# Patient Record
Sex: FEMALE | Race: WHITE | NOT HISPANIC OR LATINO | Employment: FULL TIME | URBAN - METROPOLITAN AREA
[De-identification: names, ages, dates, MRNs, and addresses within clinical notes are randomized per-mention and may not be internally consistent; named-entity substitution may affect disease eponyms.]

---

## 2021-03-16 ENCOUNTER — IMMUNIZATION (OUTPATIENT)
Dept: NURSING | Facility: CLINIC | Age: 41
End: 2021-03-16
Payer: COMMERCIAL

## 2021-03-16 PROCEDURE — 0001A PR COVID VAC PFIZER DIL RECON 30 MCG/0.3 ML IM: CPT

## 2021-03-16 PROCEDURE — 91300 PR COVID VAC PFIZER DIL RECON 30 MCG/0.3 ML IM: CPT

## 2021-04-06 ENCOUNTER — IMMUNIZATION (OUTPATIENT)
Dept: NURSING | Facility: CLINIC | Age: 41
End: 2021-04-06
Attending: INTERNAL MEDICINE
Payer: COMMERCIAL

## 2021-04-06 PROCEDURE — 0002A PR COVID VAC PFIZER DIL RECON 30 MCG/0.3 ML IM: CPT

## 2021-04-06 PROCEDURE — 91300 PR COVID VAC PFIZER DIL RECON 30 MCG/0.3 ML IM: CPT

## 2021-05-01 ENCOUNTER — HEALTH MAINTENANCE LETTER (OUTPATIENT)
Age: 41
End: 2021-05-01

## 2021-10-11 ENCOUNTER — HEALTH MAINTENANCE LETTER (OUTPATIENT)
Age: 41
End: 2021-10-11

## 2022-05-22 ENCOUNTER — HEALTH MAINTENANCE LETTER (OUTPATIENT)
Age: 42
End: 2022-05-22

## 2022-09-25 ENCOUNTER — HEALTH MAINTENANCE LETTER (OUTPATIENT)
Age: 42
End: 2022-09-25

## 2023-03-30 ENCOUNTER — OFFICE VISIT (OUTPATIENT)
Dept: FAMILY MEDICINE | Facility: CLINIC | Age: 43
End: 2023-03-30
Payer: COMMERCIAL

## 2023-03-30 VITALS
HEIGHT: 68 IN | WEIGHT: 293 LBS | HEART RATE: 86 BPM | OXYGEN SATURATION: 96 % | TEMPERATURE: 98 F | DIASTOLIC BLOOD PRESSURE: 101 MMHG | BODY MASS INDEX: 44.41 KG/M2 | SYSTOLIC BLOOD PRESSURE: 141 MMHG

## 2023-03-30 DIAGNOSIS — J34.3 HYPERTROPHY OF NASAL TURBINATES: ICD-10-CM

## 2023-03-30 DIAGNOSIS — Z01.818 PREOP GENERAL PHYSICAL EXAM: Primary | ICD-10-CM

## 2023-03-30 DIAGNOSIS — E66.01 MORBID OBESITY (H): ICD-10-CM

## 2023-03-30 PROBLEM — R87.810 ASCUS WITH POSITIVE HIGH RISK HPV CERVICAL: Status: ACTIVE | Noted: 2017-05-10

## 2023-03-30 PROBLEM — R87.610 ASCUS WITH POSITIVE HIGH RISK HPV CERVICAL: Status: ACTIVE | Noted: 2017-05-10

## 2023-03-30 PROBLEM — Z97.5 IUD (INTRAUTERINE DEVICE) IN PLACE: Status: ACTIVE | Noted: 2019-08-15

## 2023-03-30 PROCEDURE — 99204 OFFICE O/P NEW MOD 45 MIN: CPT | Performed by: PHYSICIAN ASSISTANT

## 2023-03-30 RX ORDER — ESCITALOPRAM OXALATE 20 MG/1
1 TABLET ORAL DAILY
COMMUNITY
Start: 2022-04-02 | End: 2023-06-05

## 2023-03-30 NOTE — PROGRESS NOTES
[facility]  [address] [place] [address]  [place] [address]  Phone: [phone]  Primary Provider: Nilam Augustin  Pre-op Performing Provider: NILAM AUGUSTIN      PREOPERATIVE EVALUATION:  Today's date: 3/30/2023    Digna Jacome is a 42 year old female who presents for a preoperative evaluation.  Additional Questions 3/30/2023   Roomed by reg     Surgical Information:  Surgery/Procedure: sinus endoscopy  Surgery Location: [facility]  Surgeon: Dr. Valladares  Surgery Date: 4/4/23  Time of Surgery: 1:00pm  Where patient plans to recover: At home with family  Fax number for surgical facility: [phone]    Assessment & Plan     The proposed surgical procedure is considered INTERMEDIATE risk.    Preop general physical exam  Hypertrophy of nasal turbinates  Morbid obesity (H)         Risks and Recommendations:  The patient has the following additional risks and recommendations for perioperative complications:   - Morbid obesity (BMI >40)    Medication Instructions:  Patient is to take all scheduled medications on the day of surgery    RECOMMENDATION:  APPROVAL GIVEN to proceed with proposed procedure, without further diagnostic evaluation.            Subjective     HPI related to upcoming procedure: Persistent difficulty breathing through nose.     Preop Questions 3/30/2023   1. Have you ever had a heart attack or stroke? No   2. Have you ever had surgery on your heart or blood vessels, such as a stent placement, a coronary artery bypass, or surgery on an artery in your head, neck, heart, or legs? No   3. Do you have chest pain with activity? No   4. Do you have a history of  heart failure? No   5. Do you currently have a cold, bronchitis or symptoms of other infection? No   6. Do you have a cough, shortness of breath, or wheezing? No   7. Do you or anyone in your family have previous history of blood clots? No   8. Do you or does anyone in your family have a serious bleeding problem such as prolonged  bleeding following surgeries or cuts? No   9. Have you ever had problems with anemia or been told to take iron pills? No   10. Have you had any abnormal blood loss such as black, tarry or bloody stools, or abnormal vaginal bleeding? No   11. Have you ever had a blood transfusion? No   12. Are you willing to have a blood transfusion if it is medically needed before, during, or after your surgery? Yes   13. Have you or any of your relatives ever had problems with anesthesia? No   14. Do you have sleep apnea, excessive snoring or daytime drowsiness? YES - due to sinus issues   14a. Do you have a CPAP machine? No   15. Do you have any artifical heart valves or other implanted medical devices like a pacemaker, defibrillator, or continuous glucose monitor? No   16. Do you have artificial joints? No   17. Are you allergic to latex? No   18. Is there any chance that you may be pregnant? No       Health Care Directive:  Patient does not have a Health Care Directive or Living Will: Discussed advance care planning with patient; information given to patient to review.    Preoperative Review of :   reviewed - no record of controlled substances prescribed.          Review of Systems  CONSTITUTIONAL: NEGATIVE for fever, chills, change in weight  INTEGUMENTARY/SKIN: NEGATIVE for worrisome rashes, moles or lesions  EYES: NEGATIVE for vision changes or irritation  ENT/MOUTH: NEGATIVE for ear, mouth and throat problems  RESP: NEGATIVE for significant cough or SOB  CV: NEGATIVE for chest pain, palpitations or peripheral edema  GI: NEGATIVE for nausea, abdominal pain, heartburn, or change in bowel habits  : NEGATIVE for frequency, dysuria, or hematuria  MUSCULOSKELETAL: NEGATIVE for significant arthralgias or myalgia  NEURO: NEGATIVE for weakness, dizziness or paresthesias  ENDOCRINE: NEGATIVE for temperature intolerance, skin/hair changes  HEME: NEGATIVE for bleeding problems  PSYCHIATRIC: NEGATIVE for changes in mood or  "affect    Patient Active Problem List    Diagnosis Date Noted     Morbid obesity (H) 2023     Priority: Medium     IUD (intrauterine device) in place 08/15/2019     Priority: Medium     Formatting of this note might be different from the original.  Mirena, placed 8/15/2019 .       ASCUS with positive high risk HPV cervical 05/10/2017     Priority: Medium     Formatting of this note might be different from the original.  20 office note documents no hx of abnormal pap tests   NILM, HPV negative 36 y.o.   ASCUS; HPV High Risk Other Than 16/18  40 y.o.  Plan per ASCCP guidelines: [place]       Generalized anxiety disorder 2013     Priority: Medium     Chronic rhinitis 2012     Priority: Medium     Hypertrophy of nasal turbinates 2012     Priority: Medium     Deviated nasal septum 2012     Priority: Medium     Essential hypertension, benign 2012     Priority: Medium      History reviewed. No pertinent past medical history.  History reviewed. No pertinent surgical history.  Current Outpatient Medications   Medication Sig Dispense Refill     escitalopram (LEXAPRO) 20 MG tablet Take 1 tablet by mouth daily       levonorgestrel (MIRENA) 20 MCG/DAY IUD 1 each by Intrauterine route         Allergies   Allergen Reactions     Erythromycin Nausea and Vomiting     A-Cillin Rash        Social History     Tobacco Use     Smoking status: Former     Types: Cigarettes     Quit date: 2011     Years since quittin.2     Smokeless tobacco: Never     Tobacco comments:     Quit smoking 5 yrs ago   Substance Use Topics     Alcohol use: Yes     Comment: Alcoholic Drinks/day: 2 -3 drinks/week       History   Drug Use No         Objective     BP (!) 141/101 (BP Location: Right arm, Patient Position: Sitting, Cuff Size: Adult Large)   Pulse 86   Temp 98  F (36.7  C) (Tympanic)   Ht 1.721 m (5' 7.75\")   Wt (!) 157.2 kg (346 lb 9.6 oz)   LMP  (LMP Unknown)   SpO2 96%   BMI 53.09 kg/m  "     Physical Exam    GENERAL APPEARANCE: healthy, alert and no distress     EYES: EOMI, PERRL     HENT: ear canals and TM's normal and nose and mouth without ulcers or lesions     NECK: no adenopathy, no asymmetry, masses, or scars and thyroid normal to palpation     RESP: lungs clear to auscultation - no rales, rhonchi or wheezes     CV: regular rates and rhythm, normal S1 S2, no S3 or S4 and no murmur, click or rub     ABDOMEN:  soft, nontender, no HSM or masses and bowel sounds normal     MS: extremities normal- no gross deformities noted, no evidence of inflammation in joints, FROM in all extremities.     SKIN: no suspicious lesions or rashes     NEURO: Normal strength and tone, sensory exam grossly normal, mentation intact and speech normal     PSYCH: mentation appears normal. and affect normal/bright     LYMPHATICS: No cervical adenopathy      Diagnostics:  No labs were ordered during this visit.   No EKG required, no history of coronary heart disease, significant arrhythmia, peripheral arterial disease or other structural heart disease.    Revised Cardiac Risk Index (RCRI):  The patient has the following serious cardiovascular risks for perioperative complications:   - No serious cardiac risks = 0 points     RCRI Interpretation: 0 points: Class I (very low risk - 0.4% complication rate)           Signed Electronically by: Nilam Roca PA-C  Copy of this evaluation report is provided to requesting physician.

## 2023-03-30 NOTE — PATIENT INSTRUCTIONS
For informational purposes only. Not to replace the advice of your health care provider. Copyright   2003,  [facility]. All rights reserved. Clinically reviewed by Shaneka Wilson MD. [employer] [id number] - REV .  Preparing for Your Surgery  Getting started  A nurse will call you to review your health history and instructions. They will give you an arrival time based on your scheduled surgery time. Please be ready to share:    Your doctor's clinic name and phone number    Your medical, surgical, and anesthesia history    A list of allergies and sensitivities    A list of medicines, including herbal treatments and over-the-counter drugs    Whether the patient has a legal guardian (ask how to send us the papers in advance)  Please tell us if you're pregnant--or if there's any chance you might be pregnant. Some surgeries may injure a fetus (unborn baby), so they require a pregnancy test. Surgeries that are safe for a fetus don't always need a test, and you can choose whether to have one.   If you have a child who's having surgery, please ask for a copy of Preparing for Your Child's Surgery.    Preparing for surgery    Within 10 to 30 days of surgery: Have a pre-op exam (sometimes called an H&P, or History and Physical). This can be done at a clinic or pre-operative center.  ? If you're having a , you may not need this exam. Talk to your care team.    At your pre-op exam, talk to your care team about all medicines you take. If you need to stop any medicines before surgery, ask when to start taking them again.  ? We do this for your safety. Many medicines can make you bleed too much during surgery. Some change how well surgery (anesthesia) drugs work.    Call your insurance company to let them know you're having surgery. (If you don't have insurance, call [phone].)    Call your clinic if there's any change in your health. This includes signs of a cold or flu (sore throat, runny nose,  cough, rash, fever). It also includes a scrape or scratch near the surgery site.    If you have questions on the day of surgery, call your hospital or surgery center.  Eating and drinking guidelines  For your safety: Unless your surgeon tells you otherwise, follow the guidelines below.    Eat and drink as usual until 8 hours before you arrive for surgery. After that, no food or milk.    Drink clear liquids until 2 hours before you arrive. These are liquids you can see through, like water, Gatorade, and Propel Water. They also include plain black coffee and tea (no cream or milk), candy, and breath mints. You can spit out gum when you arrive.    If you drink alcohol: Stop drinking it the night before surgery.    If your care team tells you to take medicine on the morning of surgery, it's okay to take it with a sip of water.  Preventing infection    Shower or bathe the night before and morning of your surgery. Follow the instructions your clinic gave you. (If no instructions, use regular soap.)    Don't shave or clip hair near your surgery site. We'll remove the hair if needed.    Don't smoke or vape the morning of surgery. You may chew nicotine gum up to 2 hours before surgery. A nicotine patch is okay.  ? Note: Some surgeries require you to completely quit smoking and nicotine. Check with your surgeon.    Your care team will make every effort to keep you safe from infection. We will:  ? Clean our hands often with soap and water (or an alcohol-based hand rub).  ? Clean the skin at your surgery site with a special soap that kills germs.  ? Give you a special gown to keep you warm. (Cold raises the risk of infection.)  ? Wear special hair covers, masks, gowns and gloves during surgery.  ? Give antibiotic medicine, if prescribed. Not all surgeries need antibiotics.  What to bring on the day of surgery    Photo ID and insurance card    Copy of your health care directive, if you have one    Glasses and hearing aids (bring  cases)  ? You can't wear contacts during surgery    Inhaler and eye drops, if you use them (tell us about these when you arrive)    CPAP machine or breathing device, if you use them    A few personal items, if spending the night    If you have . . .  ? A pacemaker, ICD (cardiac defibrillator) or other implant: Bring the ID card.  ? An implanted stimulator: Bring the remote control.  ? A legal guardian: Bring a copy of the certified (court-stamped) guardianship papers.  Please remove any jewelry, including body piercings. Leave jewelry and other valuables at home.  If you're going home the day of surgery    You must have a responsible adult drive you home. They should stay with you overnight as well.    If you don't have someone to stay with you, and you aren't safe to go home alone, we may keep you overnight. Insurance often won't pay for this.  After surgery  If it's hard to control your pain or you need more pain medicine, please call your surgeon's office.  Questions?   If you have any questions for your care team, list them here: _________________________________________________________________________________________________________________________________________________________________________ ____________________________________ ____________________________________ ____________________________________

## 2023-06-04 ENCOUNTER — HEALTH MAINTENANCE LETTER (OUTPATIENT)
Age: 43
End: 2023-06-04

## 2023-06-05 ENCOUNTER — OFFICE VISIT (OUTPATIENT)
Dept: FAMILY MEDICINE | Facility: CLINIC | Age: 43
End: 2023-06-05
Payer: COMMERCIAL

## 2023-06-05 VITALS
RESPIRATION RATE: 16 BRPM | BODY MASS INDEX: 43.4 KG/M2 | WEIGHT: 293 LBS | HEART RATE: 80 BPM | OXYGEN SATURATION: 97 % | SYSTOLIC BLOOD PRESSURE: 138 MMHG | HEIGHT: 69 IN | TEMPERATURE: 97.4 F | DIASTOLIC BLOOD PRESSURE: 90 MMHG

## 2023-06-05 DIAGNOSIS — Z00.00 ROUTINE GENERAL MEDICAL EXAMINATION AT A HEALTH CARE FACILITY: Primary | ICD-10-CM

## 2023-06-05 DIAGNOSIS — I10 ESSENTIAL HYPERTENSION, BENIGN: ICD-10-CM

## 2023-06-05 DIAGNOSIS — Z12.4 CERVICAL CANCER SCREENING: ICD-10-CM

## 2023-06-05 DIAGNOSIS — F41.1 GENERALIZED ANXIETY DISORDER: ICD-10-CM

## 2023-06-05 DIAGNOSIS — R73.09 ELEVATED GLUCOSE: ICD-10-CM

## 2023-06-05 DIAGNOSIS — R63.5 WEIGHT GAIN: ICD-10-CM

## 2023-06-05 LAB
ERYTHROCYTE [DISTWIDTH] IN BLOOD BY AUTOMATED COUNT: 13.6 % (ref 10–15)
HCT VFR BLD AUTO: 38.9 % (ref 35–47)
HGB BLD-MCNC: 12.5 G/DL (ref 11.7–15.7)
MCH RBC QN AUTO: 28.2 PG (ref 26.5–33)
MCHC RBC AUTO-ENTMCNC: 32.1 G/DL (ref 31.5–36.5)
MCV RBC AUTO: 88 FL (ref 78–100)
PLATELET # BLD AUTO: 365 10E3/UL (ref 150–450)
RBC # BLD AUTO: 4.43 10E6/UL (ref 3.8–5.2)
WBC # BLD AUTO: 10.2 10E3/UL (ref 4–11)

## 2023-06-05 PROCEDURE — 99396 PREV VISIT EST AGE 40-64: CPT | Performed by: FAMILY MEDICINE

## 2023-06-05 PROCEDURE — 85027 COMPLETE CBC AUTOMATED: CPT | Performed by: FAMILY MEDICINE

## 2023-06-05 PROCEDURE — 84443 ASSAY THYROID STIM HORMONE: CPT | Performed by: FAMILY MEDICINE

## 2023-06-05 PROCEDURE — G0145 SCR C/V CYTO,THINLAYER,RESCR: HCPCS | Performed by: FAMILY MEDICINE

## 2023-06-05 PROCEDURE — 87624 HPV HI-RISK TYP POOLED RSLT: CPT | Performed by: FAMILY MEDICINE

## 2023-06-05 PROCEDURE — 80061 LIPID PANEL: CPT | Performed by: FAMILY MEDICINE

## 2023-06-05 PROCEDURE — 83036 HEMOGLOBIN GLYCOSYLATED A1C: CPT | Performed by: FAMILY MEDICINE

## 2023-06-05 PROCEDURE — 36415 COLL VENOUS BLD VENIPUNCTURE: CPT | Performed by: FAMILY MEDICINE

## 2023-06-05 PROCEDURE — 99213 OFFICE O/P EST LOW 20 MIN: CPT | Mod: 25 | Performed by: FAMILY MEDICINE

## 2023-06-05 PROCEDURE — 80053 COMPREHEN METABOLIC PANEL: CPT | Performed by: FAMILY MEDICINE

## 2023-06-05 RX ORDER — ESCITALOPRAM OXALATE 20 MG/1
20 TABLET ORAL DAILY
Qty: 90 TABLET | Refills: 3 | Status: SHIPPED | OUTPATIENT
Start: 2023-06-05 | End: 2024-08-02

## 2023-06-05 ASSESSMENT — ENCOUNTER SYMPTOMS
CONSTIPATION: 0
FEVER: 0
BREAST MASS: 0
COUGH: 0
ABDOMINAL PAIN: 0
HEMATURIA: 0
HEARTBURN: 0
WEAKNESS: 0
PALPITATIONS: 0
DYSURIA: 0
NERVOUS/ANXIOUS: 1
MYALGIAS: 0
DIZZINESS: 0
CHILLS: 0
HEADACHES: 0
HEMATOCHEZIA: 0
EYE PAIN: 0
SORE THROAT: 0
PARESTHESIAS: 0
SHORTNESS OF BREATH: 0
ARTHRALGIAS: 0
NAUSEA: 0
DIARRHEA: 0
JOINT SWELLING: 0
FREQUENCY: 0

## 2023-06-05 NOTE — PROGRESS NOTES
SUBJECTIVE:   CC: Digna is an 42 year old who presents for preventive health visit.       2023     1:52 PM   Additional Questions   Roomed by Mela Warren MA   Accompanied by SELF     Healthy Habits:    Getting at least 3 servings of Calcium per day:  Yes    Bi-annual eye exam:  NO    Dental care twice a year:  NO    Sleep apnea or symptoms of sleep apnea:  None    Diet:  Regular (no restrictions)    Frequency of exercise:  2-3 days/week    Duration of exercise:  45-60 minutes    Taking medications regularly:  Yes    Medication side effects:  None    PHQ-2 Total Score:    Additional concerns today:  [place]    Has been out of lexapro for about a month. Has taken this for several years and it helps her anxiety a lot. Previous PCP left the clinic.     Just started working with a nutritionist at a clinic in [place], talking about movement, too. She does typically hike with her dogs every weekend.     Previously did 3 months of BP meds but they didn't seem to help. She checks on her own and it's been 140/90. Checks once every couple months.     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 Very close with parents and supportive partner. Has 2 dogs. Lots of volunteer work. Escorting at  clinics, also at an animal shelter.        Social History     Tobacco Use     Smoking status: Former     Years: 10.00     Types: Cigarettes     Start date: 1999     Quit date: 2011     Years since quittin.3     Smokeless tobacco: Never     Tobacco comments:     Mainly smoked when drinking/at bars   Vaping Use     Vaping status: Never Used     Passive vaping exposure: Yes   Substance Use Topics     Alcohol use: Yes     Comment: Alcoholic Drinks/day: 2 -3 drinks/week           2023     1:11 PM   Alcohol Use   Prescreen: >3 drinks/day or >7 drinks/week? No     Reviewed orders with patient.  Reviewed health  maintenance and updated orders accordingly - Yes    Breast Cancer Screening:    FHS-7:       3/30/2023     7:22 AM 6/5/2023     1:12 PM   Breast CA Risk Assessment (FHS-7)   Did any of your first-degree relatives have breast or ovarian cancer? Yes No   Did any of your relatives have bilateral breast cancer? No Unknown   Did any man in your family have breast cancer? No No   Did any woman in your family have breast and ovarian cancer? No Yes   Did any woman in your family have breast cancer before age 50 y? No No   Do you have 2 or more relatives with breast and/or ovarian cancer? Yes No   Do you have 2 or more relatives with breast and/or bowel cancer? No No       Mammogram Screening - Offered annual screening and updated Health Maintenance for mutual plan based on risk factor consideration. Patient would like to hold off on mammogram at this time.     History of abnormal Pap smear: NO - age 30-65 PAP every 5 years with negative HPV co-testing recommended     Reviewed and updated as needed this visit by clinical staff   Tobacco  Allergies  Meds              Reviewed and updated as needed this visit by Provider                     Review of Systems   Constitutional: Negative for chills and fever.   HENT: Negative for congestion, ear pain, hearing loss and sore throat.    Eyes: Negative for pain and visual disturbance.   Respiratory: Negative for cough and shortness of breath.    Cardiovascular: Negative for chest pain, palpitations and peripheral edema.   Gastrointestinal: Negative for abdominal pain, constipation, diarrhea, heartburn, hematochezia and nausea.   Breasts:  Negative for tenderness, breast mass and discharge.   Genitourinary: Negative for dysuria, frequency, genital sores, hematuria, pelvic pain, urgency, vaginal bleeding and vaginal discharge.   Musculoskeletal: Negative for arthralgias, joint swelling and myalgias.   Skin: Negative for rash.   Neurological: Negative for dizziness, weakness,  "headaches and paresthesias.   Psychiatric/Behavioral: Negative for mood changes. The patient is nervous/anxious.         OBJECTIVE:   BP (!) 135/94   Pulse 80   Temp 97.4  F (36.3  C) (Temporal)   Resp 16   Ht 1.74 m (5' 8.5\")   Wt (!) 159.9 kg (352 lb 8 oz)   SpO2 97%   BMI 52.82 kg/m    Physical Exam  GENERAL: healthy, alert and no distress  EYES: Eyes grossly normal to inspection, PERRL and conjunctivae and sclerae normal  HENT: ear canals and TM's normal, nose and mouth without ulcers or lesions  NECK: no adenopathy, no asymmetry, masses, or scars and thyroid normal to palpation  RESP: lungs clear to auscultation - no rales, rhonchi or wheezes  BREAST: normal without masses, tenderness or nipple discharge and no palpable axillary masses or adenopathy  CV: regular rate and rhythm, normal S1 S2, no S3 or S4, no murmur, click or rub, no peripheral edema and peripheral pulses strong  ABDOMEN: soft, nontender, no hepatosplenomegaly, no masses and bowel sounds normal   (female): normal female external genitalia, normal urethral meatus, vaginal mucosa pink, moist, well rugated, and normal cervix/adnexa/uterus without masses or discharge  MS: no gross musculoskeletal defects noted, no edema  SKIN: no suspicious lesions or rashes. Some scattered nevi, benign-appearing.   NEURO: Normal strength and tone, mentation intact and speech normal  PSYCH: mentation appears normal, affect normal/bright    Diagnostic Test Results:  Labs reviewed in Epic    ASSESSMENT/PLAN:   Digna was seen today for physical.    Diagnoses and all orders for this visit:    Routine general medical examination at a health care facility  -     Lipid Profile  -     Glucose  -     Comprehensive metabolic panel (BMP + Alb, Alk Phos, ALT, AST, Total. Bili, TP)  -     CBC with platelets      Cervical cancer screening  -     Pap Screen with HPV - recommended age 30 - 65 years  -     HPV Hold (Lab Only)    Generalized anxiety disorder  -     " "escitalopram (LEXAPRO) 20 MG tablet; Take 1 tablet (20 mg) by mouth daily    Weight gain: will check TSH given recent weight gain. Discussed efforts at healthy nutrition habits and exercise. She will let me know if she would like any more resources around this.   -     TSH with free T4 reflex    Essential hypertension, benign: BP is 138/90 today on second check. She would like to monitor more consistently at home for the next couple of weeks. Will check daily and send me results over [employer]. If persistently elevated, would start antihypertensive.     Other orders  -     REVIEW OF HEALTH MAINTENANCE PROTOCOL ORDERS        Patient has been advised of split billing requirements and indicates understanding: Yes      COUNSELING:  Reviewed preventive health counseling, as reflected in patient instructions       Regular exercise       Healthy diet/nutrition      BMI:   Estimated body mass index is 52.82 kg/m  as calculated from the following:    Height as of this encounter: 1.74 m (5' 8.5\").    Weight as of this encounter: 159.9 kg (352 lb 8 oz).       She reports that she quit smoking about 12 years ago. Her smoking use included cigarettes. She started smoking about 23 years ago. She has never used smokeless tobacco.      Lindsay Turner MD  [facility]  "

## 2023-06-06 LAB
ALBUMIN SERPL BCG-MCNC: 4.1 G/DL (ref 3.5–5.2)
ALP SERPL-CCNC: 91 U/L (ref 35–104)
ALT SERPL W P-5'-P-CCNC: 16 U/L (ref 10–35)
ANION GAP SERPL CALCULATED.3IONS-SCNC: 12 MMOL/L (ref 7–15)
AST SERPL W P-5'-P-CCNC: 21 U/L (ref 10–35)
BILIRUB SERPL-MCNC: 0.4 MG/DL
BUN SERPL-MCNC: 7.6 MG/DL (ref 6–20)
CALCIUM SERPL-MCNC: 9 MG/DL (ref 8.6–10)
CHLORIDE SERPL-SCNC: 105 MMOL/L (ref 98–107)
CHOLEST SERPL-MCNC: 142 MG/DL
CREAT SERPL-MCNC: 0.63 MG/DL (ref 0.51–0.95)
DEPRECATED HCO3 PLAS-SCNC: 19 MMOL/L (ref 22–29)
FASTING STATUS PATIENT QL REPORTED: YES
GFR SERPL CREATININE-BSD FRML MDRD: >90 ML/MIN/1.73M2
GLUCOSE SERPL-MCNC: 101 MG/DL (ref 70–99)
GLUCOSE SERPL-MCNC: 101 MG/DL (ref 70–99)
HDLC SERPL-MCNC: 45 MG/DL
LDLC SERPL CALC-MCNC: 68 MG/DL
NONHDLC SERPL-MCNC: 97 MG/DL
POTASSIUM SERPL-SCNC: 4 MMOL/L (ref 3.4–5.3)
PROT SERPL-MCNC: 7.4 G/DL (ref 6.4–8.3)
SODIUM SERPL-SCNC: 136 MMOL/L (ref 136–145)
TRIGL SERPL-MCNC: 145 MG/DL
TSH SERPL DL<=0.005 MIU/L-ACNC: 3.16 UIU/ML (ref 0.3–4.2)

## 2023-06-07 LAB
BKR LAB AP GYN ADEQUACY: NORMAL
BKR LAB AP GYN INTERPRETATION: NORMAL
BKR LAB AP HPV REFLEX: NORMAL
BKR LAB AP PREVIOUS ABNORMAL: NORMAL
HBA1C MFR BLD: 6.2 % (ref 0–5.6)
PATH REPORT.COMMENTS IMP SPEC: NORMAL
PATH REPORT.COMMENTS IMP SPEC: NORMAL
PATH REPORT.RELEVANT HX SPEC: NORMAL

## 2023-06-08 LAB
HUMAN PAPILLOMA VIRUS 16 DNA: NEGATIVE
HUMAN PAPILLOMA VIRUS 18 DNA: NEGATIVE
HUMAN PAPILLOMA VIRUS FINAL DIAGNOSIS: NORMAL
HUMAN PAPILLOMA VIRUS OTHER HR: NEGATIVE

## 2023-09-12 ENCOUNTER — OFFICE VISIT (OUTPATIENT)
Dept: ENDOCRINOLOGY | Facility: CLINIC | Age: 43
End: 2023-09-12
Payer: COMMERCIAL

## 2023-09-12 ENCOUNTER — TELEPHONE (OUTPATIENT)
Dept: ENDOCRINOLOGY | Facility: CLINIC | Age: 43
End: 2023-09-12

## 2023-09-12 ENCOUNTER — VIRTUAL VISIT (OUTPATIENT)
Dept: ENDOCRINOLOGY | Facility: CLINIC | Age: 43
End: 2023-09-12
Payer: COMMERCIAL

## 2023-09-12 VITALS
DIASTOLIC BLOOD PRESSURE: 93 MMHG | BODY MASS INDEX: 43.4 KG/M2 | HEART RATE: 87 BPM | OXYGEN SATURATION: 98 % | HEIGHT: 69 IN | WEIGHT: 293 LBS | SYSTOLIC BLOOD PRESSURE: 135 MMHG

## 2023-09-12 VITALS — HEIGHT: 69 IN | BODY MASS INDEX: 43.4 KG/M2 | WEIGHT: 293 LBS

## 2023-09-12 DIAGNOSIS — E66.01 MORBID OBESITY (H): ICD-10-CM

## 2023-09-12 DIAGNOSIS — E66.01 MORBID OBESITY (H): Primary | ICD-10-CM

## 2023-09-12 DIAGNOSIS — Z71.3 NUTRITIONAL COUNSELING: Primary | ICD-10-CM

## 2023-09-12 PROCEDURE — 99207 PR NO CHARGE LOS: CPT | Mod: VID | Performed by: DIETITIAN, REGISTERED

## 2023-09-12 PROCEDURE — 97802 MEDICAL NUTRITION INDIV IN: CPT | Mod: VID | Performed by: DIETITIAN, REGISTERED

## 2023-09-12 PROCEDURE — 99203 OFFICE O/P NEW LOW 30 MIN: CPT | Performed by: INTERNAL MEDICINE

## 2023-09-12 RX ORDER — ALPRAZOLAM 0.25 MG
0.25 TABLET ORAL DAILY PRN
COMMUNITY
Start: 2017-09-05

## 2023-09-12 RX ORDER — SEMAGLUTIDE 0.25 MG/.5ML
0.25 INJECTION, SOLUTION SUBCUTANEOUS WEEKLY
Qty: 2 ML | Refills: 0 | Status: SHIPPED | OUTPATIENT
Start: 2023-09-12 | End: 2023-11-13 | Stop reason: ALTCHOICE

## 2023-09-12 ASSESSMENT — PAIN SCALES - GENERAL
PAINLEVEL: NO PAIN (1)
PAINLEVEL: NO PAIN (0)

## 2023-09-12 NOTE — NURSING NOTE
Is the patient currently in the state of [place]? YES    Visit mode:VIDEO    If the visit is dropped, the patient can be reconnected by: VIDEO VISIT: Text to cell phone:   Telephone Information:   Mobile [phone]       Will anyone else be joining the visit? NO  (If patient encounters technical issues they should call [phone])    How would you like to obtain your AVS? MyChart    Are changes needed to the allergy or medication list? Pt stated no changes to allergies and Pt stated no med changes    Reason for visit: Consult    Margaret FALLF

## 2023-09-12 NOTE — PATIENT INSTRUCTIONS
Nutrition  Resources:    Fast, easy meal components/ideas:    - String Cheese    - Hard boiled eggs    - Tuna    - Shrimp     - Roit chicken    - Pre-made chicken in refrigerator section (plain or flavored, example: [employer])     - Frozen chicken     - Deli meat    - Wraps or sandwiches     - Vegetables with hummus OR another dip    - Ready to go grain packs     - Protein bars    - Protein shakes     - Fruit    - Salad kits    - Chicken sausage links     - Smoothies    - Consider Barilla Protein + pasta over regular         Follow-Up:  Wednesday, November 8th at 1:00 pm    JEFF Mccall, RD, LD  Clinic #: [phone]

## 2023-09-12 NOTE — TELEPHONE ENCOUNTER
PA Initiation    Medication: WEGOVY 0.25 MG/0.5ML SC SOAJ  Insurance Company: [employer] - Phone [phone] Fax [phone]  Pharmacy Filling the Rx:    Filling Pharmacy Phone:    Filling Pharmacy Fax:    Start Date: 9/12/2023    Key: [id number]
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
## 2023-09-12 NOTE — LETTER
"2023       RE: Digna Jacome  [address]  [facility] [address]     Dear Colleague,    Thank you for referring your patient, Digna Jacome, to the [facility] WEIGHT MANAGEMENT CLINIC [facility]. Please see a copy of my visit note below.    Video-Visit Details    Type of service:  Video Visit    Video Start Time: 2:35 pm   Video End Time: 3:01 pm    Originating Location (pt. Location): Home    Distant Location (provider location):  Offsite (providers home)     Platform used for Video Visit: Neocutis      New Weight Management Nutrition Consultation    Digna Jacome is a 43 year old female presents today for new weight management nutrition consultation.  Patient referred by Dr. Mari on 2023.    Patient with Co-morbidities of obesity includin/11/2023    11:20 AM   --   I have the following health issues associated with obesity Pre-Diabetes    High Blood Pressure    Asthma   I have the following symptoms associated with obesity Depression    Back Pain    Hip Pain         Anthropometrics:  Weight 23: 354 lbs with BMI 53.04    Estimated body mass index is 53.04 kg/m  as calculated from the following:    Height as of this encounter: 1.74 m (5' 8.5\").    Weight as of this encounter: 160.6 kg (354 lb).    Medications for Weight Loss:  Wegovy    NUTRITION HISTORY  Food allergies: NKFA  Does eat meat/fish/seafood - chicken mostly. Prefers fish cooked by others (feels she struggles to make it taste less fishy)  Food intolerances: doesn't feel good eating higher sugar/higher carb  Endorses being a picky eater  Previous methods of diet modification for weight loss: keto  RD before: No, WW, nutrisource    Pt goals: make better food choices    Meal preps but feels sick of choices come Th/Friday and prefers to order take out.  Tends to be an all or nothing person per pt.    Typically eats 2-3 meals/day.   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 Protein shake or toast or fruit   If you do eat lunch, what types of food do you typically eat? [place] and fruit or popcorn or leftovers   If you do eat supper, what types of food do you typically eat? Leftovers or chicken/rice or delivery or popcorn   If you do snack, what types of food do you typically eat? Popcorn   How many glasses of juice do you drink in a typical day? 0   How many of glasses of milk do you drink in a typical day? 0   How many 8oz glasses of sugar containing drinks such as Saroj-Aid/sweet tea do you drink in a day? 0   How many cans/bottles of sugar pop/soda/tea/sports drinks do you drink in a day? 0   How many cans/bottles of diet pop/soda/tea or sports drink do you drink in a day? 1   How often do you have a drink of alcohol? 2-4 Times a Month   If you do drink, how many drinks might you have in a day? 1 or 2           9/11/2023    11:20 AM   Eating Habits   Generally, my meals include foods like these bread, pasta, rice, potatoes, corn, crackers, sweet dessert, pop, or juice A Few Times a Week   Generally, my meals include foods like these fried meats, brats, burgers, french fries, pizza, cheese, chips, or ice cream Once a Week   Eat fast food (like McDonalds, Burger Ryan, Taco Bell) Once a Week   Eat at a buffet or sit-down restaurant Once a Week   Eat most of my meals in front of the TV or computer Almost Everyday   Often skip meals, eat at random times, have no regular eating times A Few Times a Week   Rarely sit down for a meal but snack or graze throughout Less Than Weekly   Eat extra snacks between meals Less Than Weekly   Eat most of my food at the end of the  day Almost Everyday   Eat in the middle of the night or wake up at night to eat Never   Eat extra snacks to prevent or correct low blood sugar Never   Eat to prevent acid reflux or stomach pain Never   Worry about not having enough food to eat Never   I eat when I am depressed Less Than Weekly   I eat when I am stressed A Few Times a Week   I eat when I am bored A Few Times a Week   I eat when I am anxious A Few Times a Week   I eat when I am happy or as a reward Less Than Weekly   I feel hungry all the time even if I just have eaten A Few Times a Week   Feeling full is important to me Never   I finish all the food on my plate even if I am already full Once a Week   I can't resist eating delicious food or walk past the good food/smell A Few Times a Week   I eat/snack without noticing that I am eating A Few Times a Week   I eat when I am preparing the meal Less Than Weekly   I eat more than usual when I see others eating A Few Times a Week   I have trouble not eating sweets, ice cream, cookies, or chips if they are around the house Almost Everyday   I think about food all day Everyday   What foods, if any, do you crave? Chips/Crackers   Please list any other foods you crave? Cheesy things, anything that can be dipped           9/11/2023    11:20 AM   Amount of Food   I feel out of control when eating Weekly   I eat a large amount of food, like a loaf of bread, a box of cookies, a pint/quart of ice cream, all at once Weekly   I eat a large amount of food even when I am not hungry Almost Everyday   I eat rapidly Everyday   I eat alone because I feel embarrassed and do not want others to see how much I have eaten Weekly   I eat until I am uncomfortably full Weekly   I feel bad, disgusted, or guilty after I overeat Weekly       Physical Activity:        9/11/2023    11:20 AM   Activity/Exercise History   How much of a typical 12 hour day do you spend sitting? Half the Day   How much of a typical 12 hour day do you spend  lying down? Less Than Half the Day   How much of a typical day do you spend walking/standing? Less Than Half the Day   How many hours (not including work) do you spend on the TV/Video Games/Computer/Tablet/Phone? 4-5 Hours   How many times a week are you active for the purpose of exercise? 2-3 Times a Week   What keeps you from being more active? Lack of Time    Too tired    Other   How many total minutes do you spend doing some activity for the purpose of exercising when you exercise? More Than 30 Minutes       Nutrition Prescription  Recommended energy/nutrient modification.      Nutrition Diagnosis  Obesity r/t long history of positive energy balance aeb BMI >30.    Nutrition Intervention  Reviewed current dietary habits and pts history   Discussed long-term goals pt hopes to accomplish in RD appointments  Answered pt questions  Coordination of care   Nutrition education   AVS and handouts via [employer]    Expected Engagement: good    Nutrition  Resources:    Fast, easy meal components/ideas:    - String Cheese    - Hard boiled eggs    - Tuna    - Shrimp     - Roit chicken    - Pre-made chicken in refrigerator section (plain or flavored, example: [employer] Masala Chicken)     - Frozen chicken     - Deli meat    - Wraps or sandwiches     - Vegetables with hummus OR another dip    - Ready to go grain packs     - Protein bars    - Protein shakes     - Fruit    - Salad kits    - Chicken sausage links     - Smoothies    - Consider Barilla Protein + pasta over regular         Follow-Up:  Wednesday, November 8th at 1:00 pm    Time spent with patient: 26 minutes.  JEFF Ramírez, RD, LD

## 2023-09-12 NOTE — PATIENT INSTRUCTIONS
Start Wegovy 0.25 mg weekly x4 weeks;  increase to 0.5 mg weekly x4 weeks;  increase to 1.0 mg weekly for 4 weeks;  increase to 1.7 mg weekly for 4 weeks;  increase to 2.4 mg weekly thereafter    See Lauren Bloch, PharmD in 2 month(s)  See Dr.Tasma MACDONALD in 4 month(s)    If you have any questions, please do not hesitate to call Weight management clinic at [phone] or [phone].  If you need to fax, please fax to [phone].    Sincerely,    Kamaljit Stephens MD  Endocrinology

## 2023-09-12 NOTE — PROGRESS NOTES
"Video-Visit Details    Type of service:  Video Visit    Video Start Time: 2:35 pm   Video End Time: 3:01 pm    Originating Location (pt. Location): Home    Distant Location (provider location):  Offsite (providers home)     Platform used for Video Visit: [employer]      New Weight Management Nutrition Consultation    Digna Jacome is a 43 year old female presents today for new weight management nutrition consultation.  Patient referred by Dr. Mari on 2023.    Patient with Co-morbidities of obesity includin/11/2023    11:20 AM   --   I have the following health issues associated with obesity Pre-Diabetes    High Blood Pressure    Asthma   I have the following symptoms associated with obesity Depression    Back Pain    Hip Pain         Anthropometrics:  Weight 23: 354 lbs with BMI 53.04    Estimated body mass index is 53.04 kg/m  as calculated from the following:    Height as of this encounter: 1.74 m (5' 8.5\").    Weight as of this encounter: 160.6 kg (354 lb).    Medications for Weight Loss:  Wegovy    NUTRITION HISTORY  Food allergies: NKFA  Does eat meat/fish/seafood - chicken mostly. Prefers fish cooked by others (feels she struggles to make it taste less fishy)  Food intolerances: doesn't feel good eating higher sugar/higher carb  Endorses being a picky eater  Previous methods of diet modification for weight loss: keto  RD before: No, WW, nutrisource    Pt goals: make better food choices    Meal preps but feels sick of choices come Thur/Friday and prefers to order take out.  Tends to be an all or nothing person per pt.    Typically eats 2-3 meals/day.   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 Protein shake or toast or fruit   If you do eat lunch, what types of food do you typically eat? [place] and fruit or popcorn or leftovers   If you do eat supper, what types of food do you typically eat? Leftovers or chicken/rice or delivery or popcorn   If you do snack, what types of food do you typically eat? Popcorn   How many glasses of juice do you drink in a typical day? 0   How many of glasses of milk do you drink in a typical day? 0   How many 8oz glasses of sugar containing drinks such as Saroj-Aid/sweet tea do you drink in a day? 0   How many cans/bottles of sugar pop/soda/tea/sports drinks do you drink in a day? 0   How many cans/bottles of diet pop/soda/tea or sports drink do you drink in a day? 1   How often do you have a drink of alcohol? 2-4 Times a Month   If you do drink, how many drinks might you have in a day? 1 or 2           9/11/2023    11:20 AM   Eating Habits   Generally, my meals include foods like these bread, pasta, rice, potatoes, corn, crackers, sweet dessert, pop, or juice A Few Times a Week   Generally, my meals include foods like these fried meats, brats, burgers, french fries, pizza, cheese, chips, or ice cream Once a Week   Eat fast food (like McDonalds, Burger Ryan, Taco Bell) Once a Week   Eat at a buffet or sit-down restaurant Once a Week   Eat most of my meals in front of the TV or computer Almost Everyday   Often skip meals, eat at random times, have no regular eating times A Few Times a Week   Rarely sit down for a meal but snack or graze throughout Less Than Weekly   Eat extra snacks between meals Less Than Weekly   Eat most of my food at the end of the day Almost Everyday   Eat in the middle of the night or wake up at night to eat Never   Eat extra snacks to prevent or correct low blood sugar Never   Eat to prevent acid reflux or stomach pain Never   Worry about not having enough food to eat Never   I eat when I am depressed Less Than Weekly   I eat when I am  stressed A Few Times a Week   I eat when I am bored A Few Times a Week   I eat when I am anxious A Few Times a Week   I eat when I am happy or as a reward Less Than Weekly   I feel hungry all the time even if I just have eaten A Few Times a Week   Feeling full is important to me Never   I finish all the food on my plate even if I am already full Once a Week   I can't resist eating delicious food or walk past the good food/smell A Few Times a Week   I eat/snack without noticing that I am eating A Few Times a Week   I eat when I am preparing the meal Less Than Weekly   I eat more than usual when I see others eating A Few Times a Week   I have trouble not eating sweets, ice cream, cookies, or chips if they are around the house Almost Everyday   I think about food all day Everyday   What foods, if any, do you crave? Chips/Crackers   Please list any other foods you crave? Cheesy things, anything that can be dipped           9/11/2023    11:20 AM   Amount of Food   I feel out of control when eating Weekly   I eat a large amount of food, like a loaf of bread, a box of cookies, a pint/quart of ice cream, all at once Weekly   I eat a large amount of food even when I am not hungry Almost Everyday   I eat rapidly Everyday   I eat alone because I feel embarrassed and do not want others to see how much I have eaten Weekly   I eat until I am uncomfortably full Weekly   I feel bad, disgusted, or guilty after I overeat Weekly       Physical Activity:        9/11/2023    11:20 AM   Activity/Exercise History   How much of a typical 12 hour day do you spend sitting? Half the Day   How much of a typical 12 hour day do you spend lying down? Less Than Half the Day   How much of a typical day do you spend walking/standing? Less Than Half the Day   How many hours (not including work) do you spend on the TV/Video Games/Computer/Tablet/Phone? 4-5 Hours   How many times a week are you active for the purpose of exercise? 2-3 Times a Week    What keeps you from being more active? Lack of Time    Too tired    Other   How many total minutes do you spend doing some activity for the purpose of exercising when you exercise? More Than 30 Minutes       Nutrition Prescription  Recommended energy/nutrient modification.      Nutrition Diagnosis  Obesity r/t long history of positive energy balance aeb BMI >30.    Nutrition Intervention  Reviewed current dietary habits and pts history   Discussed long-term goals pt hopes to accomplish in RD appointments  Answered pt questions  Coordination of care   Nutrition education   AVS and handouts via Planbox    Expected Engagement: good    Nutrition  Resources:    Fast, easy meal components/ideas:    - String Cheese    - Hard boiled eggs    - Tuna    - Shrimp     - Roit chicken    - Pre-made chicken in refrigerator section (plain or flavored, example: [employer] G[employer] Marlyn Masala Chicken)     - Frozen chicken     - Deli meat    - Wraps or sandwiches     - Vegetables with hummus OR another dip    - Ready to go grain packs     - Protein bars    - Protein shakes     - Fruit    - Salad kits    - Chicken sausage links     - Smoothies    - Consider Barilla Protein + pasta over regular         Follow-Up:  Wednesday, November 8th at 1:00 pm    Time spent with patient: 26 minutes.  JEFF Ramírez, RD, LD

## 2023-09-12 NOTE — NURSING NOTE
"(   Chief Complaint   Patient presents with    New Patient     New MWM, BMI of 52.8    )    ( Weight: (!) 160.7 kg (354 lb 3.2 oz) )  ( Height: 174 cm (5' 8.5\") )  ( BMI (Calculated): 53.07 )  (   )  (   )  (   )  (   )  (   )  (   )    ( BP: (!) 135/93 )  (   )  (   )  (   )  ( Pulse: 87 )  (   )  ( SpO2: 98 % )    (   Patient Active Problem List   Diagnosis    Chronic rhinitis    Hypertrophy of nasal turbinates    Essential hypertension, benign    Morbid obesity (H)    ASCUS with positive high risk HPV cervical    Generalized anxiety disorder    Deviated nasal septum    IUD (intrauterine device) in place    )  (   Current Outpatient Medications   Medication Sig Dispense Refill    albuterol (PROAIR HFA/PROVENTIL HFA/VENTOLIN HFA) 108 (90 BASE) MCG/ACT Inhaler       ALPRAZolam (XANAX) 0.25 MG tablet       escitalopram (LEXAPRO) 20 MG tablet Take 1 tablet (20 mg) by mouth daily 90 tablet 3    levonorgestrel (MIRENA) 20 MCG/DAY IUD 1 each by Intrauterine route      )  ( Diabetes Eval:    )    ( Pain Eval:  No Pain (0) )    ( Wound Eval:       )    (   History   Smoking Status    Former    Years: 10.00    Types: Cigarettes    Start date: 9/1/1999    Quit date: 1/16/2011   Smokeless Tobacco    Never    )    ( Signed By:  EMMANUEL Castillo; September 12, 2023; 9:37 AM )    "
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
## 2023-09-14 NOTE — TELEPHONE ENCOUNTER
Prior Authorization Approval    Medication: WEGOVY 0.25 MG/0.5ML SC SOAJ  Authorization Effective Date: 9/12/2023  Authorization Expiration Date: 4/12/2024  Approved Dose/Quantity: 2ml/28 days   Reference #: [id number]   Insurance Company: [employer] - Phone [phone] Fax [phone]  Expected CoPay: 50.00     CoPay Card Available:      Financial Assistance Needed: no  Which Pharmacy is filling the prescription:    Pharmacy Notified:    Patient Notified:

## 2023-11-08 ENCOUNTER — VIRTUAL VISIT (OUTPATIENT)
Dept: ENDOCRINOLOGY | Facility: CLINIC | Age: 43
End: 2023-11-08
Payer: COMMERCIAL

## 2023-11-08 VITALS — HEIGHT: 69 IN | BODY MASS INDEX: 53.04 KG/M2

## 2023-11-08 DIAGNOSIS — E66.9 OBESITY: ICD-10-CM

## 2023-11-08 DIAGNOSIS — Z71.3 NUTRITIONAL COUNSELING: Primary | ICD-10-CM

## 2023-11-08 PROCEDURE — 97803 MED NUTRITION INDIV SUBSEQ: CPT | Mod: VID | Performed by: DIETITIAN, REGISTERED

## 2023-11-08 PROCEDURE — 99207 PR NO CHARGE LOS: CPT | Mod: VID | Performed by: DIETITIAN, REGISTERED

## 2023-11-08 ASSESSMENT — PAIN SCALES - GENERAL: PAINLEVEL: NO PAIN (1)

## 2023-11-08 NOTE — NURSING NOTE
Is the patient currently in the state of [place]? YES    Visit mode:VIDEO    If the visit is dropped, the patient can be reconnected by: VIDEO VISIT: Text to cell phone:   Telephone Information:   Mobile [phone]       Will anyone else be joining the visit? NO  (If patient encounters technical issues they should call [phone]    How would you like to obtain your AVS? MyChart    Are changes needed to the allergy or medication list? Pt stated no changes to allergies and Pt stated no med changes    Reason for visit: Follow Up    Margaret Howe VVF    No current wt to report

## 2023-11-08 NOTE — PATIENT INSTRUCTIONS
Nutrition Goals/Resources:  Aim for 1 alcoholic drink/hour at social gatherings and 1+ water/drink  Aim to make at least one snack/appetizer choice protein based at social gathering  Aim to include a protein with lunch/dinner  Aim to get a vegetable at least 1x/day    Fast, easy meal components/ideas:    - String Cheese    - Hard boiled eggs    - Tuna    - Shrimp     - Roit chicken    - Pre-made chicken in refrigerator section (plain or flavored, example: [employer] Marlyn Harris Chicken)     - Frozen chicken     - Deli meat    - Wraps or sandwiches     - Vegetables with hummus OR another dip    - Ready to go grain packs     - Protein bars    - Protein shakes     - Fruit    - Salad kits    - Chicken sausage links     - Smoothies    - Consider Barilla Protein + pasta over regular         Follow-Up:  Thursday, December 14th at 10:30 am   Thursday, January 11th at 9:00 am    Emilia Gan ([place]), ALEX-D, RD, LD  Clinic #: [phone]

## 2023-11-08 NOTE — PROGRESS NOTES
"Video-Visit Details    Type of service:  Video Visit    Video Start Time: 12:59 pm  Video End Time:  1:18 pm    Originating Location (pt. Location): Home    Distant Location (provider location):  Offsite (providers home)     Platform used for Video Visit: [employer]      Weight Management Nutrition Consultation    Digna Jacome is a 43 year old female presents today for weight management nutrition consultation.  Patient referred by Dr. Mari on 2023.    Patient with Co-morbidities of obesity includin/11/2023    11:20 AM   --   I have the following health issues associated with obesity Pre-Diabetes    High Blood Pressure    Asthma   I have the following symptoms associated with obesity Depression    Back Pain    Hip Pain         Anthropometrics:  Weight 23: 354 lbs with BMI 53.04    Estimated body mass index is 53.04 kg/m  as calculated from the following:    Height as of 23: 1.74 m (5' 8.5\").    Weight as of 23: 160.6 kg (354 lb).    Medications for Weight Loss:  Wegovy - has not been able to get yet due to shortage     NUTRITION HISTORY  Food allergies: NKFA  Does eat meat/fish/seafood - chicken mostly. Prefers fish cooked by others (feels she struggles to make it taste less fishy)  Food intolerances: doesn't feel good eating higher sugar/higher carb  Endorses being a picky eater  Previous methods of diet modification for weight loss: keto  RD before: No, WW, nutrisource    Pt goals: make better food choices    Meal preps but feels sick of choices come Thur/Friday and prefers to order take out.  Tends to be an all or nothing person per pt.    Typically eats 2-3 meals/day.   Feels she does well during the day with listening to hunger/satiety but harder in evenings (stress, anxiety, not as busy)    PA: pickle ball, hiking, walking dog    2023:  Things going well overall.    Busy week with work- 14+ hr days this week.    Trying to make better choices from vending machine vs getting " take out (granola bar, baked chips yesterday or example). Keeping apples and string cheese on hand. Found a salad she really enjoys. Made some chicken and twice baked potatoes for this week.    Hydration going well. Getting around 100 oz of water/day.    PA: less intentional movement past week with being busier with work, however getting more steps with work.    Additional information:  FT - , Child Protection          9/11/2023    11:20 AM   Diet Recall Review with Patient   If you do eat breakfast, what types of food do you eat? Protein shake or toast or fruit   If you do eat lunch, what types of food do you typically eat? [place] and fruit or popcorn or leftovers   If you do eat supper, what types of food do you typically eat? Leftovers or chicken/rice or delivery or popcorn   If you do snack, what types of food do you typically eat? Popcorn   How many glasses of juice do you drink in a typical day? 0   How many of glasses of milk do you drink in a typical day? 0   How many 8oz glasses of sugar containing drinks such as Saroj-Aid/sweet tea do you drink in a day? 0   How many cans/bottles of sugar pop/soda/tea/sports drinks do you drink in a day? 0   How many cans/bottles of diet pop/soda/tea or sports drink do you drink in a day? 1   How often do you have a drink of alcohol? 2-4 Times a Month   If you do drink, how many drinks might you have in a day? 1 or 2           9/11/2023    11:20 AM   Eating Habits   Generally, my meals include foods like these bread, pasta, rice, potatoes, corn, crackers, sweet dessert, pop, or juice A Few Times a Week   Generally, my meals include foods like these fried meats, brats, burgers, french fries, pizza, cheese, chips, or ice cream Once a Week   Eat fast food (like McDonalds, [employer] Ryan, [employer] Bell) Once a Week   Eat at a buffet or sit-down restaurant Once a Week   Eat most of my meals in front of the TV or computer Almost Everyday   Often skip meals, eat at random  times, have no regular eating times A Few Times a Week   Rarely sit down for a meal but snack or graze throughout Less Than Weekly   Eat extra snacks between meals Less Than Weekly   Eat most of my food at the end of the day Almost Everyday   Eat in the middle of the night or wake up at night to eat Never   Eat extra snacks to prevent or correct low blood sugar Never   Eat to prevent acid reflux or stomach pain Never   Worry about not having enough food to eat Never   I eat when I am depressed Less Than Weekly   I eat when I am stressed A Few Times a Week   I eat when I am bored A Few Times a Week   I eat when I am anxious A Few Times a Week   I eat when I am happy or as a reward Less Than Weekly   I feel hungry all the time even if I just have eaten A Few Times a Week   Feeling full is important to me Never   I finish all the food on my plate even if I am already full Once a Week   I can't resist eating delicious food or walk past the good food/smell A Few Times a Week   I eat/snack without noticing that I am eating A Few Times a Week   I eat when I am preparing the meal Less Than Weekly   I eat more than usual when I see others eating A Few Times a Week   I have trouble not eating sweets, ice cream, cookies, or chips if they are around the house Almost Everyday   I think about food all day Everyday   What foods, if any, do you crave? Chips/Crackers   Please list any other foods you crave? Cheesy things, anything that can be dipped           9/11/2023    11:20 AM   Amount of Food   I feel out of control when eating Weekly   I eat a large amount of food, like a loaf of bread, a box of cookies, a pint/quart of ice cream, all at once Weekly   I eat a large amount of food even when I am not hungry Almost Everyday   I eat rapidly Everyday   I eat alone because I feel embarrassed and do not want others to see how much I have eaten Weekly   I eat until I am uncomfortably full Weekly   I feel bad, disgusted, or guilty  after I overeat Weekly       Physical Activity:        9/11/2023    11:20 AM   Activity/Exercise History   How much of a typical 12 hour day do you spend sitting? Half the Day   How much of a typical 12 hour day do you spend lying down? Less Than Half the Day   How much of a typical day do you spend walking/standing? Less Than Half the Day   How many hours (not including work) do you spend on the TV/Video Games/Computer/Tablet/Phone? 4-5 Hours   How many times a week are you active for the purpose of exercise? 2-3 Times a Week   What keeps you from being more active? Lack of Time    Too tired    Other   How many total minutes do you spend doing some activity for the purpose of exercising when you exercise? More Than 30 Minutes       Nutrition Prescription  Recommended energy/nutrient modification.      Nutrition Diagnosis  Obesity r/t long history of positive energy balance aeb BMI >30.    Nutrition Intervention  Reviewed and modified plan  Answered pt questions  Coordination of care   Nutrition education   AVS and handouts via [employer]    Expected Engagement: good    Nutrition Goals/Resources:  Aim for 1 alcoholic drink/hour at social gatherings and 1+ water/drink  Aim to make at least one snack/appetizer choice protein based at social gathering  Aim to include a protein with lunch/dinner  Aim to get a vegetable at least 1x/day    Fast, easy meal components/ideas:    - String Cheese    - Hard boiled eggs    - Tuna    - Shrimp     - Roit chicken    - Pre-made chicken in refrigerator section (plain or flavored, example: [employer] Gather Marlyn Harris Chicken)     - Frozen chicken     - Deli meat    - Wraps or sandwiches     - Vegetables with hummus OR another dip    - Ready to go grain packs     - Protein bars    - Protein shakes     - Fruit    - Salad kits    - Chicken sausage links     - Smoothies    - Consider Barilla Protein + pasta over regular         Follow-Up:  Thursday, December 14th at 10:30 am   Thursday,  January 11th at 9:00 am    Time spent with patient: 19 minutes.  JEFF Ramírez, RD, LD

## 2023-11-08 NOTE — LETTER
"2023       RE: Digna Jacome  [address]  [facility] [address]     Dear Colleague,    Thank you for referring your patient, Digna Jacome, to the [facility] [place] [facility]. Please see a copy of my visit note below.    Video-Visit Details    Type of service:  Video Visit    Video Start Time: 12:59 pm  Video End Time:  1:18 pm    Originating Location (pt. Location): Home    Distant Location (provider location):  Offsite (providers home)     Platform used for Video Visit: [employer]      Weight Management Nutrition Consultation    Digna Jacome is a 43 year old female presents today for weight management nutrition consultation.  Patient referred by Dr. Mari on 2023.    Patient with Co-morbidities of obesity includin/11/2023    11:20 AM   --   I have the following health issues associated with obesity Pre-Diabetes    High Blood Pressure    Asthma   I have the following symptoms associated with obesity Depression    Back Pain    Hip Pain         Anthropometrics:  Weight 23: 354 lbs with BMI 53.04    Estimated body mass index is 53.04 kg/m  as calculated from the following:    Height as of 23: 1.74 m (5' 8.5\").    Weight as of 23: 160.6 kg (354 lb).    Medications for Weight Loss:  Wegovy - has not been able to get yet due to shortage     NUTRITION HISTORY  Food allergies: NKFA  Does eat meat/fish/seafood - chicken mostly. Prefers fish cooked by others (feels she struggles to make it taste less fishy)  Food intolerances: doesn't feel good eating higher sugar/higher carb  Endorses being a picky eater  Previous methods of diet modification for weight loss: keto  RD before: No, WW, nutrisource    Pt goals: make better food choices    Meal preps but feels sick of choices come Th/Friday and prefers to order take out.  Tends to be an all or nothing person per pt.    Typically eats 2-3 meals/day. "   Feels she does well during the day with listening to hunger/satiety but harder in evenings (stress, anxiety, not as busy)    PA: pickle ball, hiking, walking dog    Nov 2023:  Things going well overall.    Busy week with work- 14+ hr days this week.    Trying to make better choices from vending machine vs getting take out (granola bar, baked chips yesterday or example). Keeping apples and string cheese on hand. Found a salad she really enjoys. Made some chicken and twice baked potatoes for this week.    Hydration going well. Getting around 100 oz of water/day.    PA: less intentional movement past week with being busier with work, however getting more steps with work.    Additional information:  FT - , Child Protection          9/11/2023    11:20 AM   Diet Recall Review with Patient   If you do eat breakfast, what types of food do you eat? Protein shake or toast or fruit   If you do eat lunch, what types of food do you typically eat? [place] and fruit or popcorn or leftovers   If you do eat supper, what types of food do you typically eat? Leftovers or chicken/rice or delivery or popcorn   If you do snack, what types of food do you typically eat? Popcorn   How many glasses of juice do you drink in a typical day? 0   How many of glasses of milk do you drink in a typical day? 0   How many 8oz glasses of sugar containing drinks such as Saroj-Aid/sweet tea do you drink in a day? 0   How many cans/bottles of sugar pop/soda/tea/sports drinks do you drink in a day? 0   How many cans/bottles of diet pop/soda/tea or sports drink do you drink in a day? 1   How often do you have a drink of alcohol? 2-4 Times a Month   If you do drink, how many drinks might you have in a day? 1 or 2           9/11/2023    11:20 AM   Eating Habits   Generally, my meals include foods like these bread, pasta, rice, potatoes, corn, crackers, sweet dessert, pop, or juice A Few Times a Week   Generally, my meals include foods like these  fried meats, brats, burgers, french fries, pizza, cheese, chips, or ice cream Once a Week   Eat fast food (like McDonalds, Burger Ryan, Taco Bell) Once a Week   Eat at a buffet or sit-down restaurant Once a Week   Eat most of my meals in front of the TV or computer Almost Everyday   Often skip meals, eat at random times, have no regular eating times A Few Times a Week   Rarely sit down for a meal but snack or graze throughout Less Than Weekly   Eat extra snacks between meals Less Than Weekly   Eat most of my food at the end of the day Almost Everyday   Eat in the middle of the night or wake up at night to eat Never   Eat extra snacks to prevent or correct low blood sugar Never   Eat to prevent acid reflux or stomach pain Never   Worry about not having enough food to eat Never   I eat when I am depressed Less Than Weekly   I eat when I am stressed A Few Times a Week   I eat when I am bored A Few Times a Week   I eat when I am anxious A Few Times a Week   I eat when I am happy or as a reward Less Than Weekly   I feel hungry all the time even if I just have eaten A Few Times a Week   Feeling full is important to me Never   I finish all the food on my plate even if I am already full Once a Week   I can't resist eating delicious food or walk past the good food/smell A Few Times a Week   I eat/snack without noticing that I am eating A Few Times a Week   I eat when I am preparing the meal Less Than Weekly   I eat more than usual when I see others eating A Few Times a Week   I have trouble not eating sweets, ice cream, cookies, or chips if they are around the house Almost Everyday   I think about food all day Everyday   What foods, if any, do you crave? Chips/Crackers   Please list any other foods you crave? Cheesy things, anything that can be dipped           9/11/2023    11:20 AM   Amount of Food   I feel out of control when eating Weekly   I eat a large amount of food, like a loaf of bread, a box of cookies, a pint/quart  of ice cream, all at once Weekly   I eat a large amount of food even when I am not hungry Almost Everyday   I eat rapidly Everyday   I eat alone because I feel embarrassed and do not want others to see how much I have eaten Weekly   I eat until I am uncomfortably full Weekly   I feel bad, disgusted, or guilty after I overeat Weekly       Physical Activity:        9/11/2023    11:20 AM   Activity/Exercise History   How much of a typical 12 hour day do you spend sitting? Half the Day   How much of a typical 12 hour day do you spend lying down? Less Than Half the Day   How much of a typical day do you spend walking/standing? Less Than Half the Day   How many hours (not including work) do you spend on the TV/Video Games/Computer/Tablet/Phone? 4-5 Hours   How many times a week are you active for the purpose of exercise? 2-3 Times a Week   What keeps you from being more active? Lack of Time    Too tired    Other   How many total minutes do you spend doing some activity for the purpose of exercising when you exercise? More Than 30 Minutes       Nutrition Prescription  Recommended energy/nutrient modification.      Nutrition Diagnosis  Obesity r/t long history of positive energy balance aeb BMI >30.    Nutrition Intervention  Reviewed and modified plan  Answered pt questions  Coordination of care   Nutrition education   AVS and handouts via [employer]    Expected Engagement: good    Nutrition Goals/Resources:  Aim for 1 alcoholic drink/hour at social gatherings and 1+ water/drink  Aim to make at least one snack/appetizer choice protein based at social gathering  Aim to include a protein with lunch/dinner  Aim to get a vegetable at least 1x/day    Fast, easy meal components/ideas:    - String Cheese    - Hard boiled eggs    - Tuna    - Shrimp     - Roit chicken    - Pre-made chicken in refrigerator section (plain or flavored, example: Good & Gather Marlyn Masala Chicken)     - Frozen chicken     - Deli meat    - Wraps or  sandwiches     - Vegetables with hummus OR another dip    - Ready to go grain packs     - Protein bars    - Protein shakes     - Fruit    - Salad kits    - Chicken sausage links     - Smoothies    - Consider Barilla Protein + pasta over regular         Follow-Up:  Thursday, December 14th at 10:30 am   Thursday, January 11th at 9:00 am    Time spent with patient: 19 minutes.  Emilia Gan, JEFF, RD, LD

## 2023-11-09 ENCOUNTER — TELEPHONE (OUTPATIENT)
Dept: FAMILY MEDICINE | Facility: CLINIC | Age: 43
End: 2023-11-09
Payer: COMMERCIAL

## 2023-11-09 NOTE — TELEPHONE ENCOUNTER
Patient Quality Outreach    Patient is due for the following:   Hypertension -  Hypertension follow-up visit    Next Steps:   Patient declined follow up at this time.    Type of outreach:    Phone, spoke to patient/parent.        Questions for provider review:    None           Rober Boo RN

## 2023-11-13 ENCOUNTER — TELEPHONE (OUTPATIENT)
Dept: ENDOCRINOLOGY | Facility: CLINIC | Age: 43
End: 2023-11-13
Payer: COMMERCIAL

## 2023-11-13 ENCOUNTER — VIRTUAL VISIT (OUTPATIENT)
Dept: PHARMACY | Facility: CLINIC | Age: 43
End: 2023-11-13
Payer: COMMERCIAL

## 2023-11-13 VITALS — HEIGHT: 68 IN | BODY MASS INDEX: 53.83 KG/M2

## 2023-11-13 DIAGNOSIS — E66.01 MORBID OBESITY (H): Primary | ICD-10-CM

## 2023-11-13 PROCEDURE — 99207 PR NO CHARGE LOS: CPT | Mod: VID | Performed by: PHARMACIST

## 2023-11-13 RX ORDER — LIRAGLUTIDE 6 MG/ML
INJECTION, SOLUTION SUBCUTANEOUS
Qty: 15 ML | Refills: 2 | Status: SHIPPED | OUTPATIENT
Start: 2023-11-13 | End: 2024-03-11

## 2023-11-13 ASSESSMENT — PAIN SCALES - GENERAL: PAINLEVEL: NO PAIN (0)

## 2023-11-13 NOTE — PATIENT INSTRUCTIONS
Recommendations from today's consult with Pharmacist                                                          1. Will start Prior Authorization for Saxenda  2. Information on Saxenda is below  3. Follow up with Dr. Kamaljit Stephens at next scheduled appointment.     Thank you for your time - it was a pleasure working with you today!     Lauren Bloch, PharmD, BCACP   Medication Therapy Management Pharmacist   [facility]      Saxenda Dosing:   Start Saxenda: It is a subcutaneous injection that you inject once daily and titrate the dose slowly over time. Make sure inject the same time each day. Use a new pen needle for each injection. There are two safety caps on each pen needle, make sure you remove them both before doing your injection.   Week 1: Inject 0.6 mg once daily  Week 2: If tolerating, increase to 1.2 mg once daily   Week 3: If tolerating, increase to 1.8 mg once daily   Week 4: If tolerating, increase to 2.4 mg once daily   Week 5 & on: If tolerating, increase to 3 mg once daily   *If you are having some nausea or other side effects to where you are hesitant to move up to the next dose, stay at the same dose you are on for an additional week to see if side effect(s) improves/resolves. Make sure to take this time to hydrate and ensure you are drinking at least 64 oz water per day.     Saxenda Administration Video:   https://www.[facility].com/about-saxenda/how-to-use-the-pen.html     Saxenda Storage and Stability:   Store new, unused Saxenda pens in the refrigerator. After first use, store in a refrigerator or at room temperature. Pens in use should be thrown away after 30 days even if they still have Saxenda left in them. All used Saxenda pens can be thrown away in the trash. Used pen needles need to be stored in a sharp container. A sharps container is available at any retail store or you can make your own sharps container - information in link below.    https://www.[facility].Pending sale to [facility].[place]/sites/default/files/w-hhw4-67.pdf    Saxenda Common Side Effects:   Nausea, diarrhea, constipation, headache, tiredness (fatigue), dizziness, stomach upset/pain. Less commonly, Saxenda can cause low blood sugar (symptoms: shaky, dizzy, sweaty, agitation). Please reach out to the care team should you feel like this is occurring. It is important to ensure that you are eating consistent meals and not skipping meals. Ensure you are getting at least 64 oz water daily.

## 2023-11-13 NOTE — PROGRESS NOTES
Clinical Pharmacy Consult:                                                    Digna Jacome is a 43 year old female contacted via secure video for a clinical pharmacist consult.  She was referred to me from Dr. Kamaljit Stephens. Insurance does not cover comprehensive medication review with Medication Therapy Management (MTM). Patient declines private pay. Therefore, completed one time consult today.     Reason for Consult: Wegovy check in - patient has been unable to obtain from pharmacy.    Discussion: Wegovy was approved, but unable to start due to Wegovy national shortage. Patient is willing to consider alternative injectable option, Saxenda. Completed teaching of administration of Saxenda. Discussed mechanism of action, side effects, warnings, and efficacy. Discussed appropriate storage and stability. Answered patient questions. Will start Prior Authorization. She is also interested in [facility], so will send RX there.       Plan:  1. Will start Prior Authorization for Saxenda  2. Information on Saxenda sent via [employer].   3. Follow up with Dr. Kamaljit Stephens at next scheduled appointment.     Lauren Bloch, PharmD, BCACP   Medication Therapy Management Pharmacist   [facility] [place]

## 2023-11-13 NOTE — Clinical Note
Switching from Wegovy to Saxenda as Wegovy not available and had not been able to start due to shortage.

## 2023-11-13 NOTE — TELEPHONE ENCOUNTER
"Prior Authorization Retail Medication Request    Medication/Dose: Saxenda  ICD code (if different than what is on RX):    Previously Tried and Failed:  diet and exercise for at least 3 months without successful sustainable weight loss.   Rationale: Digna Jacome is a 43 year old female with a diagnosis of obesity (BMI at least 30 kg/m2). She cannot be on stimulant, like Phentermine or stimulant containing product like Qsymia due to current status of blood pressure (elevated) with history of high blood pressure and current anxiety. Wegovy is on shortage so patient has been unable to start Wegovy. Therefore, looking to start Saxenda.     Estimated body mass index is 53.83 kg/m  as calculated from the following:    Height as of an earlier encounter on 11/13/23: 5' 8\" (1.727 m).    Weight as of 9/12/23: 354 lb (160.6 kg).    Insurance Name:    Insurance ID:        Pharmacy Information (if different than what is on RX)  Name:    Phone:      "

## 2023-11-13 NOTE — NURSING NOTE
Is the patient currently in the state of [place]? YES    Visit mode:VIDEO    If the visit is dropped, the patient can be reconnected by: VIDEO VISIT: Text to cell phone:   Telephone Information:   Mobile [phone]       Will anyone else be joining the visit? NO  (If patient encounters technical issues they should call [phone]    How would you like to obtain your AVS? MyChart    Are changes needed to the allergy or medication list? No    Reason for visit: Consult    Pt states unable to get Wegovy due to shortage and hasn't weighed self in over 1 month.    Buddy FALLF

## 2023-11-13 NOTE — PROGRESS NOTES
Virtual Visit Details    Type of service:  Video Visit     Originating Location (pt. Location): Home    Distant Location (provider location):  On-site  Platform used for Video Visit: [employer]

## 2023-11-13 NOTE — TELEPHONE ENCOUNTER
Prior Authorization Approval    Medication: SAXENDA 18 MG/3ML SC SOPN  Authorization Effective Date: 11/13/2023  Authorization Expiration Date: 5/13/2024  Approved Dose/Quantity: 15ml/30 days   Reference #: [id number]   Insurance Company: [employer] ([facility]) - Phone [phone] Fax [phone]  Expected CoPay: $ 50  CoPay Card Available:      Financial Assistance Needed: no  Which Pharmacy is filling the prescription:    Pharmacy Notified: no  Patient Notified: pharmacy will notify patient

## 2023-11-13 NOTE — TELEPHONE ENCOUNTER
PA Initiation    Medication: SAXENDA 18 MG/3ML SC SOPN  Insurance Company: [employer] ([facility]) - Phone [phone] Fax [phone]  Pharmacy Filling the Rx:    Filling Pharmacy Phone:    Filling Pharmacy Fax:    Start Date: 11/13/2023    Key: [id number]

## 2023-12-12 NOTE — PROGRESS NOTES
"Video-Visit Details    Type of service:  Video Visit    Video Start Time: 10:32 am  Video End Time:  10:47 am    Originating Location (pt. Location): Home    Distant Location (provider location):  Offsite (providers home)     Platform used for Video Visit: [employer]      Weight Management Nutrition Consultation    Digna Jacome is a 43 year old female presents today for weight management nutrition consultation.  Patient referred by Dr. Mari on 2023.    Patient with Co-morbidities of obesity includin/11/2023    11:20 AM   --   I have the following health issues associated with obesity Pre-Diabetes    High Blood Pressure    Asthma   I have the following symptoms associated with obesity Depression    Back Pain    Hip Pain         Anthropometrics:  Weight 23: 354 lbs with BMI 53.04    Estimated body mass index is 52.1 kg/m  as calculated from the following:    Height as of this encounter: 1.727 m (5' 7.99\").    Weight as of this encounter: 155.4 kg (342 lb 9.6 oz).    Current weight: 342 lbs, pt report    Medications for Weight Loss:  Saxenda    NUTRITION HISTORY  Food allergies: NKFA  Does eat meat/fish/seafood - chicken mostly. Prefers fish cooked by others (feels she struggles to make it taste less fishy)  Food intolerances: doesn't feel good eating higher sugar/higher carb  Endorses being a picky eater  Previous methods of diet modification for weight loss: keto  RD before: No, WW, nutrisource    Pt goals: make better food choices    Meal preps but feels sick of choices come Thur/Friday and prefers to order take out.  Tends to be an all or nothing person per pt.    Typically eats 2-3 meals/day.   Feels she does well during the day with listening to hunger/satiety but harder in evenings (stress, anxiety, not as busy)    PA: pickle ball, hiking, walking dog    2023:  Things going well overall.    Busy week with work- 14+ hr days this week.    Trying to make better choices from vending " machine vs getting take out (granola bar, baked chips yesterday or example). Keeping apples and string cheese on hand. Found a salad she really enjoys. Made some chicken and twice baked potatoes for this week.    Hydration going well. Getting around 100 oz of water/day.    PA: less intentional movement past week with being busier with work, however getting more steps with work.    Dec 2023:    Things going well overall. Meeting goals most days aside from occ when traveling, etc.    Typically doing 2-3 small meals/day and 2 snacks.    Started Saxenda 2-3 weeks ago - not as hungry, smaller portions. Noticing she is eating more carbohydrates on the weekends in particular.      Snacks: string cheese, apple, protein shakes    PA: more active with nicer weather    Previous goals:  Aim for 1 alcoholic drink/hour at social gatherings and 1+ water/drink - Met  Aim to make at least one snack/appetizer choice protein based at social gathering - Met, had work happy hour last night and got wings.   Aim to include a protein with lunch/dinner - Going well, harder on weekends.  Aim to get a vegetable at least 1x/day - Met overall, not able to meet every day when traveling but ordered salads when able  Additional information:  FT - , Child Protection          9/11/2023    11:20 AM   Diet Recall Review with Patient   If you do eat breakfast, what types of food do you eat? Protein shake or toast or fruit   If you do eat lunch, what types of food do you typically eat? [place] and fruit or popcorn or leftovers   If you do eat supper, what types of food do you typically eat? Leftovers or chicken/rice or delivery or popcorn   If you do snack, what types of food do you typically eat? Popcorn   How many glasses of juice do you drink in a typical day? 0   How many of glasses of milk do you drink in a typical day? 0   How many 8oz glasses of sugar containing drinks such as Saroj-Aid/sweet tea do you drink in a day? 0   How many  cans/bottles of sugar pop/soda/tea/sports drinks do you drink in a day? 0   How many cans/bottles of diet pop/soda/tea or sports drink do you drink in a day? 1   How often do you have a drink of alcohol? 2-4 Times a Month   If you do drink, how many drinks might you have in a day? 1 or 2           9/11/2023    11:20 AM   Eating Habits   Generally, my meals include foods like these bread, pasta, rice, potatoes, corn, crackers, sweet dessert, pop, or juice A Few Times a Week   Generally, my meals include foods like these fried meats, brats, burgers, french fries, pizza, cheese, chips, or ice cream Once a Week   Eat fast food (like McDonalds, Burger Ryan, Taco Bell) Once a Week   Eat at a buffet or sit-down restaurant Once a Week   Eat most of my meals in front of the TV or computer Almost Everyday   Often skip meals, eat at random times, have no regular eating times A Few Times a Week   Rarely sit down for a meal but snack or graze throughout Less Than Weekly   Eat extra snacks between meals Less Than Weekly   Eat most of my food at the end of the day Almost Everyday   Eat in the middle of the night or wake up at night to eat Never   Eat extra snacks to prevent or correct low blood sugar Never   Eat to prevent acid reflux or stomach pain Never   Worry about not having enough food to eat Never   I eat when I am depressed Less Than Weekly   I eat when I am stressed A Few Times a Week   I eat when I am bored A Few Times a Week   I eat when I am anxious A Few Times a Week   I eat when I am happy or as a reward Less Than Weekly   I feel hungry all the time even if I just have eaten A Few Times a Week   Feeling full is important to me Never   I finish all the food on my plate even if I am already full Once a Week   I can't resist eating delicious food or walk past the good food/smell A Few Times a Week   I eat/snack without noticing that I am eating A Few Times a Week   I eat when I am preparing the meal Less Than Weekly    I eat more than usual when I see others eating A Few Times a Week   I have trouble not eating sweets, ice cream, cookies, or chips if they are around the house Almost Everyday   I think about food all day Everyday   What foods, if any, do you crave? Chips/Crackers   Please list any other foods you crave? Cheesy things, anything that can be dipped           9/11/2023    11:20 AM   Amount of Food   I feel out of control when eating Weekly   I eat a large amount of food, like a loaf of bread, a box of cookies, a pint/quart of ice cream, all at once Weekly   I eat a large amount of food even when I am not hungry Almost Everyday   I eat rapidly Everyday   I eat alone because I feel embarrassed and do not want others to see how much I have eaten Weekly   I eat until I am uncomfortably full Weekly   I feel bad, disgusted, or guilty after I overeat Weekly       Physical Activity:        9/11/2023    11:20 AM   Activity/Exercise History   How much of a typical 12 hour day do you spend sitting? Half the Day   How much of a typical 12 hour day do you spend lying down? Less Than Half the Day   How much of a typical day do you spend walking/standing? Less Than Half the Day   How many hours (not including work) do you spend on the TV/Video Games/Computer/Tablet/Phone? 4-5 Hours   How many times a week are you active for the purpose of exercise? 2-3 Times a Week   What keeps you from being more active? Lack of Time    Too tired    Other   How many total minutes do you spend doing some activity for the purpose of exercising when you exercise? More Than 30 Minutes       Nutrition Prescription  Recommended energy/nutrient modification.      Nutrition Diagnosis  Obesity r/t long history of positive energy balance aeb BMI >30.    Nutrition Intervention  Reviewed and modified plan  Answered pt questions  Coordination of care   Nutrition education   AVS and handouts via [employer]    Expected Engagement: good    Nutrition  Goals/Resources:  1.  Aim for one fruit per day or a 2nd serving of vegetables  2. Aim for a protein with every meal and with at least one snack per day  3. Continue to work on figuring out eating with meds, consider something small such as protein shake in morning vs skipping.   4. Consider eating protein first at meal time if finding it hard to get all the food groups in    Protein powder examples:   - Orgain   - Isopure   - Genepro   - Premier   - Garden of life   - Beneprotein        Fast, easy meal components/ideas:    - String Cheese    - Hard boiled eggs    - Tuna    - Shrimp     - Roit chicken    - Pre-made chicken in refrigerator section (plain or flavored, example: Good & Gather Marlyn Masala Chicken)     - Frozen chicken     - Deli meat    - Wraps or sandwiches     - Vegetables with hummus OR another dip    - Ready to go grain packs     - Protein bars    - Protein shakes     - Fruit    - Salad kits    - Chicken sausage links     - Smoothies    - Consider Barilla Protein + pasta over regular         Follow-Up:  Thursday, January 11th at 9:00 am  Thursday, February 15th at 9:30 am    Time spent with patient: 15 minutes.  JEFF Ramírez, RD, LD

## 2023-12-14 ENCOUNTER — VIRTUAL VISIT (OUTPATIENT)
Dept: ENDOCRINOLOGY | Facility: CLINIC | Age: 43
End: 2023-12-14
Payer: COMMERCIAL

## 2023-12-14 VITALS — WEIGHT: 293 LBS | BODY MASS INDEX: 44.41 KG/M2 | HEIGHT: 68 IN

## 2023-12-14 DIAGNOSIS — E66.9 OBESITY: ICD-10-CM

## 2023-12-14 DIAGNOSIS — Z71.3 NUTRITIONAL COUNSELING: Primary | ICD-10-CM

## 2023-12-14 PROCEDURE — 99207 PR NO CHARGE LOS: CPT | Mod: VID | Performed by: DIETITIAN, REGISTERED

## 2023-12-14 PROCEDURE — 97803 MED NUTRITION INDIV SUBSEQ: CPT | Mod: VID | Performed by: DIETITIAN, REGISTERED

## 2023-12-14 NOTE — NURSING NOTE
Is the patient currently in the state of [place]? YES    Visit mode:VIDEO    If the visit is dropped, the patient can be reconnected by: VIDEO VISIT: Text to cell phone:   Telephone Information:   Mobile [phone]       Will anyone else be joining the visit? NO  (If patient encounters technical issues they should call [phone]    How would you like to obtain your AVS? MyChart    Are changes needed to the allergy or medication list? Pt stated no changes to allergies and Pt stated no med changes    Reason for visit: Follow Up    Margaret SPRINGER

## 2023-12-14 NOTE — PATIENT INSTRUCTIONS
Nutrition Goals/Resources:  1.  Aim for one fruit per day or a 2nd serving of vegetables  2. Aim for a protein with every meal and with at least one snack per day  3. Continue to work on figuring out eating with meds, consider something small such as protein shake in morning vs skipping.   4. Consider eating protein first at meal time if finding it hard to get all the food groups in    Protein powder examples:   - Orgain   - Isopure   - Genepro   - Premier   - Garden of life   - Beneprotein        Fast, easy meal components/ideas:    - String Cheese    - Hard boiled eggs    - Tuna    - Shrimp     - Roit chicken    - Pre-made chicken in refrigerator section (plain or flavored, example: Good & Gather Marlyn Masala Chicken)     - Frozen chicken     - Deli meat    - Wraps or sandwiches     - Vegetables with hummus OR another dip    - Ready to go grain packs     - Protein bars    - Protein shakes     - Fruit    - Salad kits    - Chicken sausage links     - Smoothies    - Consider Barilla Protein + pasta over regular         Follow-Up:  Thursday, January 11th at 9:00 am  Thursday, February 15th at 9:30 am    JEFF Mccall (Duncan), RD, LD  Clinic #: [phone]

## 2023-12-14 NOTE — LETTER
"2023       RE: Digna Jacome  [address]  [place] [address]     Dear Colleague,    Thank you for referring your patient, Digna Jacome, to the [facility] [place] [facility]. Please see a copy of my visit note below.    Video-Visit Details    Type of service:  Video Visit    Video Start Time: 10:32 am  Video End Time:  10:47 am    Originating Location (pt. Location): Home    Distant Location (provider location):  Offsite (providers home)     Platform used for Video Visit: [employer]      Weight Management Nutrition Consultation    Digna Jacome is a 43 year old female presents today for weight management nutrition consultation.  Patient referred by Dr. Mari on 2023.    Patient with Co-morbidities of obesity includin/11/2023    11:20 AM   --   I have the following health issues associated with obesity Pre-Diabetes    High Blood Pressure    Asthma   I have the following symptoms associated with obesity Depression    Back Pain    Hip Pain         Anthropometrics:  Weight 23: 354 lbs with BMI 53.04    Estimated body mass index is 52.1 kg/m  as calculated from the following:    Height as of this encounter: 1.727 m (5' 7.99\").    Weight as of this encounter: 155.4 kg (342 lb 9.6 oz).    Current weight: 342 lbs, pt report    Medications for Weight Loss:  Saxenda    NUTRITION HISTORY  Food allergies: NKFA  Does eat meat/fish/seafood - chicken mostly. Prefers fish cooked by others (feels she struggles to make it taste less fishy)  Food intolerances: doesn't feel good eating higher sugar/higher carb  Endorses being a picky eater  Previous methods of diet modification for weight loss: keto  RD before: No, WW, nutrisource    Pt goals: make better food choices    Meal preps but feels sick of choices come Th/Friday and prefers to order take out.  Tends to be an all or nothing person per pt.    Typically eats " 2-3 meals/day.   Feels she does well during the day with listening to hunger/satiety but harder in evenings (stress, anxiety, not as busy)    PA: pickle ball, hiking, walking dog    Nov 2023:  Things going well overall.    Busy week with work- 14+ hr days this week.    Trying to make better choices from vending machine vs getting take out (granola bar, baked chips yesterday or example). Keeping apples and string cheese on hand. Found a salad she really enjoys. Made some chicken and twice baked potatoes for this week.    Hydration going well. Getting around 100 oz of water/day.    PA: less intentional movement past week with being busier with work, however getting more steps with work.    Dec 2023:    Things going well overall. Meeting goals most days aside from occ when traveling, etc.    Typically doing 2-3 small meals/day and 2 snacks.    Started Saxenda 2-3 weeks ago - not as hungry, smaller portions. Noticing she is eating more carbohydrates on the weekends in particular.      Snacks: string cheese, apple, protein shakes    PA: more active with nicer weather    Previous goals:  Aim for 1 alcoholic drink/hour at social gatherings and 1+ water/drink - Met  Aim to make at least one snack/appetizer choice protein based at social gathering - Met, had work happy hour last night and got wings.   Aim to include a protein with lunch/dinner - Going well, harder on weekends.  Aim to get a vegetable at least 1x/day - Met overall, not able to meet every day when traveling but ordered salads when able  Additional information:  FT - , Child Protection          9/11/2023    11:20 AM   Diet Recall Review with Patient   If you do eat breakfast, what types of food do you eat? Protein shake or toast or fruit   If you do eat lunch, what types of food do you typically eat? [place] and fruit or popcorn or leftovers   If you do eat supper, what types of food do you typically eat? Leftovers or chicken/rice or delivery or  popcorn   If you do snack, what types of food do you typically eat? Popcorn   How many glasses of juice do you drink in a typical day? 0   How many of glasses of milk do you drink in a typical day? 0   How many 8oz glasses of sugar containing drinks such as Saroj-Aid/sweet tea do you drink in a day? 0   How many cans/bottles of sugar pop/soda/tea/sports drinks do you drink in a day? 0   How many cans/bottles of diet pop/soda/tea or sports drink do you drink in a day? 1   How often do you have a drink of alcohol? 2-4 Times a Month   If you do drink, how many drinks might you have in a day? 1 or 2           9/11/2023    11:20 AM   Eating Habits   Generally, my meals include foods like these bread, pasta, rice, potatoes, corn, crackers, sweet dessert, pop, or juice A Few Times a Week   Generally, my meals include foods like these fried meats, brats, burgers, french fries, pizza, cheese, chips, or ice cream Once a Week   Eat fast food (like McDonalds, Burger Ryan, Taco Bell) Once a Week   Eat at a buffet or sit-down restaurant Once a Week   Eat most of my meals in front of the TV or computer Almost Everyday   Often skip meals, eat at random times, have no regular eating times A Few Times a Week   Rarely sit down for a meal but snack or graze throughout Less Than Weekly   Eat extra snacks between meals Less Than Weekly   Eat most of my food at the end of the day Almost Everyday   Eat in the middle of the night or wake up at night to eat Never   Eat extra snacks to prevent or correct low blood sugar Never   Eat to prevent acid reflux or stomach pain Never   Worry about not having enough food to eat Never   I eat when I am depressed Less Than Weekly   I eat when I am stressed A Few Times a Week   I eat when I am bored A Few Times a Week   I eat when I am anxious A Few Times a Week   I eat when I am happy or as a reward Less Than Weekly   I feel hungry all the time even if I just have eaten A Few Times a Week   Feeling full  is important to me Never   I finish all the food on my plate even if I am already full Once a Week   I can't resist eating delicious food or walk past the good food/smell A Few Times a Week   I eat/snack without noticing that I am eating A Few Times a Week   I eat when I am preparing the meal Less Than Weekly   I eat more than usual when I see others eating A Few Times a Week   I have trouble not eating sweets, ice cream, cookies, or chips if they are around the house Almost Everyday   I think about food all day Everyday   What foods, if any, do you crave? Chips/Crackers   Please list any other foods you crave? Cheesy things, anything that can be dipped           9/11/2023    11:20 AM   Amount of Food   I feel out of control when eating Weekly   I eat a large amount of food, like a loaf of bread, a box of cookies, a pint/quart of ice cream, all at once Weekly   I eat a large amount of food even when I am not hungry Almost Everyday   I eat rapidly Everyday   I eat alone because I feel embarrassed and do not want others to see how much I have eaten Weekly   I eat until I am uncomfortably full Weekly   I feel bad, disgusted, or guilty after I overeat Weekly       Physical Activity:        9/11/2023    11:20 AM   Activity/Exercise History   How much of a typical 12 hour day do you spend sitting? Half the Day   How much of a typical 12 hour day do you spend lying down? Less Than Half the Day   How much of a typical day do you spend walking/standing? Less Than Half the Day   How many hours (not including work) do you spend on the TV/Video Games/Computer/Tablet/Phone? 4-5 Hours   How many times a week are you active for the purpose of exercise? 2-3 Times a Week   What keeps you from being more active? Lack of Time    Too tired    Other   How many total minutes do you spend doing some activity for the purpose of exercising when you exercise? More Than 30 Minutes       Nutrition Prescription  Recommended energy/nutrient  modification.      Nutrition Diagnosis  Obesity r/t long history of positive energy balance aeb BMI >30.    Nutrition Intervention  Reviewed and modified plan  Answered pt questions  Coordination of care   Nutrition education   AVS and handouts via [employer]    Expected Engagement: good    Nutrition Goals/Resources:  1.  Aim for one fruit per day or a 2nd serving of vegetables  2. Aim for a protein with every meal and with at least one snack per day  3. Continue to work on figuring out eating with meds, consider something small such as protein shake in morning vs skipping.   4. Consider eating protein first at meal time if finding it hard to get all the food groups in    Protein powder examples:   - Orgain   - Isopure   - Genepro   - Premier   - Garden of life   - Beneprotein        Fast, easy meal components/ideas:    - String Cheese    - Hard boiled eggs    - Tuna    - Shrimp     - Roit chicken    - Pre-made chicken in refrigerator section (plain or flavored, example: [employer] & Gather Marlyn Masala Chicken)     - Frozen chicken     - Deli meat    - Wraps or sandwiches     - Vegetables with hummus OR another dip    - Ready to go grain packs     - Protein bars    - Protein shakes     - Fruit    - Salad kits    - Chicken sausage links     - Smoothies    - Consider Barilla Protein + pasta over regular         Follow-Up:  Thursday, January 11th at 9:00 am  Thursday, February 15th at 9:30 am    Time spent with patient: 15 minutes.  AGUSTINA RamírezD, RD, LD

## 2024-01-09 ENCOUNTER — VIRTUAL VISIT (OUTPATIENT)
Dept: ENDOCRINOLOGY | Facility: CLINIC | Age: 44
End: 2024-01-09
Payer: COMMERCIAL

## 2024-01-09 VITALS — HEIGHT: 68 IN | WEIGHT: 293 LBS | BODY MASS INDEX: 44.41 KG/M2

## 2024-01-09 DIAGNOSIS — E66.01 MORBID OBESITY (H): Primary | ICD-10-CM

## 2024-01-09 PROCEDURE — 99212 OFFICE O/P EST SF 10 MIN: CPT | Mod: 95 | Performed by: INTERNAL MEDICINE

## 2024-01-09 ASSESSMENT — PAIN SCALES - GENERAL: PAINLEVEL: NO PAIN (0)

## 2024-01-09 NOTE — LETTER
2024       RE: Digna Jacome  [address]  [facility] [address]     Dear Colleague,    Thank you for referring your patient, Digna Jacome, to the [facility] [place] [facility]. Please see a copy of my visit note below.        Return Medical Weight Management Note     Digna Jacome  MRN:  [id number]  :  1980  CECILE:  2024    Dear Lindsay Turner MD,    I had the pleasure of seeing your patient Digna Jacome. She is a 43 year old female who I am continuing to see for treatment of obesity related to: anxiety, prediabetes, hypertension, back pain, hip pain         2023    11:20 AM   --   I have the following health issues associated with obesity Pre-Diabetes    High Blood Pressure    Asthma   I have the following symptoms associated with obesity Depression    Back Pain    Hip Pain   Late 20s - gained a lot weight from depression and anxiety --> weight was up to 275 lbs then lost 50 lbs with fasting and keto. She subsequently gained back and more and then plateau at 350 lbs.     Eating habit: stress eating, binges eating, has food noise in her head, obsession with food -- chip, cheese, pizza, savory food.     INTERVAL HISTORY:  Initial visit 2023. F/up with Lauren Bloch, MTM pharmD 2023.   Wegovy approved but could not get due to national shortage.  Started Saxenda 2023. Lost 12-14 lbs.  She is currently on Saxenda 3.0 mg daily  Got some nausea if she did not eat regular.  Medication reduced food noise and hunger.     Exercise: just injured psoas muscle    CURRENT WEIGHT:   340 lbs 0 oz    Initial Weight (lbs): 345.2 lbs  Last Visits Weight: 155.4 kg (342 lb 9.6 oz)  Cumulative weight loss (lbs): 5.2  Weight Loss Percentage: 1.51%        2024     5:16 PM   Changes and Difficulties   I have made the following changes to my diet since my last visit: More veggies and protein, less carbs   With regards to my  "diet, I am still struggling with: Would like to add more veggies/fruit on a daily basis   I have made the following changes to my activity/exercise since my last visit: None   With regards to my activity/exercise, I am still struggling with: Currently recovering from a pulled psoas muscle       VITALS:  Ht 1.722 m (5' 7.8\")   Wt (!) 154.2 kg (340 lb)   BMI 52.01 kg/m      MEDICATIONS:   Current Outpatient Medications   Medication Sig Dispense Refill    albuterol (PROAIR HFA/PROVENTIL HFA/VENTOLIN HFA) 108 (90 BASE) MCG/ACT Inhaler       ALPRAZolam (XANAX) 0.25 MG tablet       escitalopram (LEXAPRO) 20 MG tablet Take 1 tablet (20 mg) by mouth daily 90 tablet 3    insulin pen needle (32G X 6 MM) 32G X 6 MM miscellaneous Use 1 pen needles daily or as directed with Saxenda. 100 each 1    levonorgestrel (MIRENA) 20 MCG/DAY IUD 1 each by Intrauterine route      liraglutide - Weight Management (SAXENDA) 18 MG/3ML pen Inject subcutaneously. Week 1: 0.6 mg daily, week 2: 1.2 mg daily, week 3: 1.8 mg daily, week 4: 2.4 mg daily, week 5 & on: 3 mg daily 15 mL 2           1/8/2024     5:16 PM   Weight Loss Medication History Reviewed With Patient   Which weight loss medications are you currently taking on a regular basis? Saxenda (liraglutide)   Are you having any side effects from the weight loss medication that we have prescribed you? Yes   If you are having side effects please describe: Gassy/nausea       ASSESSMENT:   Digna Jacome is a 43 year old female who I am continuing to see for treatment of obesity related to: anxiety, prediabetes, hypertension, back pain, hip pain     Responded well with Saxenda with tolerable side effects  Currently on Saxenda 3.0 mg daily  Appetite reduced    PLAN:   - switch from Saxenda to Wegovy 1.7 mg weekly  - see Lauren Bloch in 2 months before increasing dose  - continue to work on healthy food choice  - restart exercising once recovering from pulled psoas muscle.    FOLLOW-UP:    See " Lauren Bloch, PharmD in 2 month(s)  See Dr.Tasma MACDONALD in 4 month(s)  .    Joined the call at 1/9/2024, 8:50:16 am.  Left the call at 1/9/2024, 8:56:30 am.  You were on the call for 6 minutes 13 seconds .    External notes/medical records independently reviewed, labs and imaging independently reviewed, medical management and tests to be discussed/communicated to patient.    Time: I spent 15 minutes spent on the date of the encounter preparing to see patient (including chart review and preparation), obtaining and or reviewing additional medical history, performing a physical exam and evaluation, documenting clinical information in the electronic health record, independently interpreting results, communicating results to the patient and coordinating care.      Sincerely,    Kamaljit Stephens MD

## 2024-01-09 NOTE — PROGRESS NOTES
Virtual Visit Details    Type of service:  Video Visit     Originating Location (pt. Location): Home    Distant Location (provider location):  Off-site  Platform used for Video Visit: Alpa

## 2024-01-09 NOTE — NURSING NOTE
Is the patient currently in the state of [place]? YES    Visit mode:VIDEO    If the visit is dropped, the patient can be reconnected by: VIDEO VISIT: Text to cell phone:   Telephone Information:   Mobile [phone]       Will anyone else be joining the visit? NO  (If patient encounters technical issues they should call [phone]    How would you like to obtain your AVS? MyChart    Are changes needed to the allergy or medication list? No, Pt stated no changes to allergies, and Pt stated no med changes    Reason for visit: RECHECK (MARCK )    Delia SPRINGER

## 2024-01-10 NOTE — PROGRESS NOTES
"Video-Visit Details    Type of service:  Video Visit    Video Start Time:  8:58 am  Video End Time:  9:10 am    Originating Location (pt. Location): Home    Distant Location (provider location):  Offsite (providers home)     Platform used for Video Visit: LOOKSIMA      Weight Management Nutrition Consultation    Digna Jacome is a 43 year old female presents today for weight management nutrition consultation.  Patient referred by Dr. Mari on 2023.    Patient with Co-morbidities of obesity includin/11/2023    11:20 AM   --   I have the following health issues associated with obesity Pre-Diabetes    High Blood Pressure    Asthma   I have the following symptoms associated with obesity Depression    Back Pain    Hip Pain         Anthropometrics:  Weight 23: 354 lbs with BMI 53.04    Estimated body mass index is 51.39 kg/m  as calculated from the following:    Height as of this encounter: 1.727 m (5' 8\").    Weight as of this encounter: 153.3 kg (338 lb).    Current weight: 338 lbs, pt report   - Down 4 lbs since last RD visit    Medications for Weight Loss:  Saxenda - Plan to transition to Wegovy after last Saxenda pen    NUTRITION HISTORY  Food allergies: NKFA  Does eat meat/fish/seafood - chicken mostly. Prefers fish cooked by others (feels she struggles to make it taste less fishy)  Food intolerances: doesn't feel good eating higher sugar/higher carb  Endorses being a picky eater  Previous methods of diet modification for weight loss: keto  RD before: No, WW, nutrisource    Pt goals: make better food choices    Meal preps but feels sick of choices come Thur/Friday and prefers to order take out.  Tends to be an all or nothing person per pt.    Typically eats 2-3 meals/day.   Feels she does well during the day with listening to hunger/satiety but harder in evenings (stress, anxiety, not as busy)    PA: pickle ball, hiking, walking dog    2023:  Things going well overall.    Busy week with " work- 14+ hr days this week.    Trying to make better choices from vending machine vs getting take out (granola bar, baked chips yesterday or example). Keeping apples and string cheese on hand. Found a salad she really enjoys. Made some chicken and twice baked potatoes for this week.    Hydration going well. Getting around 100 oz of water/day.    PA: less intentional movement past week with being busier with work, however getting more steps with work.    Dec 2023:    Things going well overall. Meeting goals most days aside from occ when traveling, etc.    Typically doing 2-3 small meals/day and 2 snacks.    Started Saxenda 2-3 weeks ago - not as hungry, smaller portions. Noticing she is eating more carbohydrates on the weekends in particular.      Snacks: string cheese, apple, protein shakes    PA: more active with nicer weather    Jan 2024:    Continues to notice benefit from Saxenda - less food noise and hunger. Added in more protein/vegetables and less carbohydrates.     Hydration: 100+ oz/day water, 1 coke zero/day, almond milk in protein shake, alcohol a couple times/week   - Finds if getting 120 oz is hard to drink enough the next day    PA: limited due to recent psoas muscle injury (has been going to chiro and doing salt bathes/massages/stretches at home)    Previous goals:  1.  Aim for one fruit per day or a 2nd serving of vegetables - In progress, harder over holidays (traveling lots, not having full groceries at home due to this)  2. Aim for a protein with every meal and with at least one snack per day - Met, going well. Found some P3 to have as a breakfast or snack. Looked different on Kay - more carbs. Didn't feel as good physically.  3. Continue to work on figuring out eating with meds, consider something small such as protein shake in morning vs skipping. - Met, doing protein shake with an apple or p3 protein pack  4. Consider eating protein first at meal time if finding it hard to get all the  food groups in - Going well overall. Doing more all in one meals (hot dish). If going out to eat trying to eat protein first.      Additional information:  FT - , Child Protection          9/11/2023    11:20 AM   Diet Recall Review with Patient   If you do eat breakfast, what types of food do you eat? Protein shake or toast or fruit   If you do eat lunch, what types of food do you typically eat? [place] and fruit or popcorn or leftovers   If you do eat supper, what types of food do you typically eat? Leftovers or chicken/rice or delivery or popcorn   If you do snack, what types of food do you typically eat? Popcorn   How many glasses of juice do you drink in a typical day? 0   How many of glasses of milk do you drink in a typical day? 0   How many 8oz glasses of sugar containing drinks such as Saroj-Aid/sweet tea do you drink in a day? 0   How many cans/bottles of sugar pop/soda/tea/sports drinks do you drink in a day? 0   How many cans/bottles of diet pop/soda/tea or sports drink do you drink in a day? 1   How often do you have a drink of alcohol? 2-4 Times a Month   If you do drink, how many drinks might you have in a day? 1 or 2           9/11/2023    11:20 AM   Eating Habits   Generally, my meals include foods like these bread, pasta, rice, potatoes, corn, crackers, sweet dessert, pop, or juice A Few Times a Week   Generally, my meals include foods like these fried meats, brats, burgers, french fries, pizza, cheese, chips, or ice cream Once a Week   Eat fast food (like McDonalds, Burger Ryan, Taco Bell) Once a Week   Eat at a buffet or sit-down restaurant Once a Week   Eat most of my meals in front of the TV or computer Almost Everyday   Often skip meals, eat at random times, have no regular eating times A Few Times a Week   Rarely sit down for a meal but snack or graze throughout Less Than Weekly   Eat extra snacks between meals Less Than Weekly   Eat most of my food at the end of the day Almost  Everyday   Eat in the middle of the night or wake up at night to eat Never   Eat extra snacks to prevent or correct low blood sugar Never   Eat to prevent acid reflux or stomach pain Never   Worry about not having enough food to eat Never   I eat when I am depressed Less Than Weekly   I eat when I am stressed A Few Times a Week   I eat when I am bored A Few Times a Week   I eat when I am anxious A Few Times a Week   I eat when I am happy or as a reward Less Than Weekly   I feel hungry all the time even if I just have eaten A Few Times a Week   Feeling full is important to me Never   I finish all the food on my plate even if I am already full Once a Week   I can't resist eating delicious food or walk past the good food/smell A Few Times a Week   I eat/snack without noticing that I am eating A Few Times a Week   I eat when I am preparing the meal Less Than Weekly   I eat more than usual when I see others eating A Few Times a Week   I have trouble not eating sweets, ice cream, cookies, or chips if they are around the house Almost Everyday   I think about food all day Everyday   What foods, if any, do you crave? Chips/Crackers   Please list any other foods you crave? Cheesy things, anything that can be dipped           9/11/2023    11:20 AM   Amount of Food   I feel out of control when eating Weekly   I eat a large amount of food, like a loaf of bread, a box of cookies, a pint/quart of ice cream, all at once Weekly   I eat a large amount of food even when I am not hungry Almost Everyday   I eat rapidly Everyday   I eat alone because I feel embarrassed and do not want others to see how much I have eaten Weekly   I eat until I am uncomfortably full Weekly   I feel bad, disgusted, or guilty after I overeat Weekly       Physical Activity:        9/11/2023    11:20 AM   Activity/Exercise History   How much of a typical 12 hour day do you spend sitting? Half the Day   How much of a typical 12 hour day do you spend lying down?  Less Than Half the Day   How much of a typical day do you spend walking/standing? Less Than Half the Day   How many hours (not including work) do you spend on the TV/Video Games/Computer/Tablet/Phone? 4-5 Hours   How many times a week are you active for the purpose of exercise? 2-3 Times a Week   What keeps you from being more active? Lack of Time    Too tired    Other   How many total minutes do you spend doing some activity for the purpose of exercising when you exercise? More Than 30 Minutes       Nutrition Prescription  Recommended energy/nutrient modification.      Nutrition Diagnosis  Obesity r/t long history of positive energy balance aeb BMI >30.    Nutrition Intervention  Reviewed and modified plan  Answered pt questions  Coordination of care   Nutrition education   AVS and handouts via [employer]    Expected Engagement: good    Nutrition Goals/Resources:  Aim for one fruit per day or a 2nd serving of vegetables  Continue aiming for protein with every meal and with at least one snack per day  Continue with something small at breakfast vs. Skipping  Consider eating protein first at meal time if finding it hard to get all the food groups in     Protein powder examples:   - Orgain   - Isopure   - Genepro   - Premier   - Garden of life   - Beneprotein        Fast, easy meal components/ideas:    - String Cheese    - Hard boiled eggs    - Tuna    - Shrimp     - Roit chicken    - Pre-made chicken in refrigerator section (plain or flavored, example: [employer] Marlyn Masala Chicken)     - Frozen chicken     - Deli meat    - Wraps or sandwiches     - Vegetables with hummus OR another dip    - Ready to go grain packs     - Protein bars    - Protein shakes     - Fruit    - Salad kits    - Chicken sausage links     - Smoothies    - Consider Barilla Protein + pasta over regular       Follow-Up:  Thursday, February 15th at 9:30 am  Thursday, March 14th at 9:00 am    Time spent with patient: 12 minutes.  Emilia Gan  JEFF Weiss, DONNY, LD

## 2024-01-11 ENCOUNTER — VIRTUAL VISIT (OUTPATIENT)
Dept: ENDOCRINOLOGY | Facility: CLINIC | Age: 44
End: 2024-01-11
Payer: COMMERCIAL

## 2024-01-11 VITALS — BODY MASS INDEX: 44.41 KG/M2 | HEIGHT: 68 IN | WEIGHT: 293 LBS

## 2024-01-11 DIAGNOSIS — Z71.3 NUTRITIONAL COUNSELING: Primary | ICD-10-CM

## 2024-01-11 DIAGNOSIS — E66.9 OBESITY: ICD-10-CM

## 2024-01-11 PROCEDURE — 97803 MED NUTRITION INDIV SUBSEQ: CPT | Mod: 95 | Performed by: DIETITIAN, REGISTERED

## 2024-01-11 PROCEDURE — 99207 PR NO CHARGE LOS: CPT | Mod: 95 | Performed by: DIETITIAN, REGISTERED

## 2024-01-11 ASSESSMENT — PAIN SCALES - GENERAL: PAINLEVEL: MILD PAIN (3)

## 2024-01-11 NOTE — PATIENT INSTRUCTIONS
Nutrition Goals/Resources:  Aim for one fruit per day or a 2nd serving of vegetables  Continue aiming for protein with every meal and with at least one snack per day  Continue with something small at breakfast vs. Skipping  Consider eating protein first at meal time if finding it hard to get all the food groups in     Protein powder examples:   - Orgain   - Isopure   - Genepro   - Premier   - Garden of life   - Beneprotein        Fast, easy meal components/ideas:    - String Cheese    - Hard boiled eggs    - Tuna    - Shrimp     - Roit chicken    - Pre-made chicken in refrigerator section (plain or flavored, example: [employer] G[employer] Marlyn Masala Chicken)     - Frozen chicken     - Deli meat    - Wraps or sandwiches     - Vegetables with hummus OR another dip    - Ready to go grain packs     - Protein bars    - Protein shakes     - Fruit    - Salad kits    - Chicken sausage links     - Smoothies    - Consider Barilla Protein + pasta over regular         Follow-Up:  Thursday, February 15th at 9:30 am  Thursday, March 14th at 9:00 am    Emilia Gan (Duncan), AGUSTINAD, RD, LD  Clinic #: [phone]

## 2024-01-11 NOTE — LETTER
"2024       RE: Digna Jacome  [address]  [facility] [address]     Dear Colleague,    Thank you for referring your patient, Digna Jacome, to the [facility] [place] [facility]. Please see a copy of my visit note below.    Video-Visit Details    Type of service:  Video Visit    Video Start Time:  8:58 am  Video End Time:  9:10 am    Originating Location (pt. Location): Home    Distant Location (provider location):  Offsite (providers home)     Platform used for Video Visit: Memolane      Weight Management Nutrition Consultation    Digna Jacome is a 43 year old female presents today for weight management nutrition consultation.  Patient referred by Dr. Mari on 2023.    Patient with Co-morbidities of obesity includin/11/2023    11:20 AM   --   I have the following health issues associated with obesity Pre-Diabetes    High Blood Pressure    Asthma   I have the following symptoms associated with obesity Depression    Back Pain    Hip Pain         Anthropometrics:  Weight 23: 354 lbs with BMI 53.04    Estimated body mass index is 51.39 kg/m  as calculated from the following:    Height as of this encounter: 1.727 m (5' 8\").    Weight as of this encounter: 153.3 kg (338 lb).    Current weight: 338 lbs, pt report   - Down 4 lbs since last RD visit    Medications for Weight Loss:  Saxenda - Plan to transition to Wegovy after last Saxenda pen    NUTRITION HISTORY  Food allergies: NKFA  Does eat meat/fish/seafood - chicken mostly. Prefers fish cooked by others (feels she struggles to make it taste less fishy)  Food intolerances: doesn't feel good eating higher sugar/higher carb  Endorses being a picky eater  Previous methods of diet modification for weight loss: keto  RD before: No, WW, nutrisource    Pt goals: make better food choices    Meal preps but feels sick of choices come Thur/Friday and prefers to " order take out.  Tends to be an all or nothing person per pt.    Typically eats 2-3 meals/day.   Feels she does well during the day with listening to hunger/satiety but harder in evenings (stress, anxiety, not as busy)    PA: pickle ball, hiking, walking dog    Nov 2023:  Things going well overall.    Busy week with work- 14+ hr days this week.    Trying to make better choices from vending machine vs getting take out (granola bar, baked chips yesterday or example). Keeping apples and string cheese on hand. Found a salad she really enjoys. Made some chicken and twice baked potatoes for this week.    Hydration going well. Getting around 100 oz of water/day.    PA: less intentional movement past week with being busier with work, however getting more steps with work.    Dec 2023:    Things going well overall. Meeting goals most days aside from occ when traveling, etc.    Typically doing 2-3 small meals/day and 2 snacks.    Started Saxenda 2-3 weeks ago - not as hungry, smaller portions. Noticing she is eating more carbohydrates on the weekends in particular.      Snacks: string cheese, apple, protein shakes    PA: more active with nicer weather    Jan 2024:    Continues to notice benefit from Saxenda - less food noise and hunger. Added in more protein/vegetables and less carbohydrates.     Hydration: 100+ oz/day water, 1 coke zero/day, almond milk in protein shake, alcohol a couple times/week   - Finds if getting 120 oz is hard to drink enough the next day    PA: limited due to recent psoas muscle injury (has been going to chiro and doing salt bathes/massages/stretches at home)    Previous goals:  1.  Aim for one fruit per day or a 2nd serving of vegetables - In progress, harder over holidays (traveling lots, not having full groceries at home due to this)  2. Aim for a protein with every meal and with at least one snack per day - Met, going well. Found some P3 to have as a breakfast or snack. Looked different on  Kay - more carbs. Didn't feel as good physically.  3. Continue to work on figuring out eating with meds, consider something small such as protein shake in morning vs skipping. - Met, doing protein shake with an apple or p3 protein pack  4. Consider eating protein first at meal time if finding it hard to get all the food groups in - Going well overall. Doing more all in one meals (hot dish). If going out to eat trying to eat protein first.      Additional information:  FT - , Child Protection          9/11/2023    11:20 AM   Diet Recall Review with Patient   If you do eat breakfast, what types of food do you eat? Protein shake or toast or fruit   If you do eat lunch, what types of food do you typically eat? Steptoe and fruit or popcorn or leftovers   If you do eat supper, what types of food do you typically eat? Leftovers or chicken/rice or delivery or popcorn   If you do snack, what types of food do you typically eat? Popcorn   How many glasses of juice do you drink in a typical day? 0   How many of glasses of milk do you drink in a typical day? 0   How many 8oz glasses of sugar containing drinks such as Saroj-Aid/sweet tea do you drink in a day? 0   How many cans/bottles of sugar pop/soda/tea/sports drinks do you drink in a day? 0   How many cans/bottles of diet pop/soda/tea or sports drink do you drink in a day? 1   How often do you have a drink of alcohol? 2-4 Times a Month   If you do drink, how many drinks might you have in a day? 1 or 2           9/11/2023    11:20 AM   Eating Habits   Generally, my meals include foods like these bread, pasta, rice, potatoes, corn, crackers, sweet dessert, pop, or juice A Few Times a Week   Generally, my meals include foods like these fried meats, brats, burgers, french fries, pizza, cheese, chips, or ice cream Once a Week   Eat fast food (like McDonalds, Burger Ryan, Taco Bell) Once a Week   Eat at a buffet or sit-down restaurant Once a Week   Eat most of  my meals in front of the TV or computer Almost Everyday   Often skip meals, eat at random times, have no regular eating times A Few Times a Week   Rarely sit down for a meal but snack or graze throughout Less Than Weekly   Eat extra snacks between meals Less Than Weekly   Eat most of my food at the end of the day Almost Everyday   Eat in the middle of the night or wake up at night to eat Never   Eat extra snacks to prevent or correct low blood sugar Never   Eat to prevent acid reflux or stomach pain Never   Worry about not having enough food to eat Never   I eat when I am depressed Less Than Weekly   I eat when I am stressed A Few Times a Week   I eat when I am bored A Few Times a Week   I eat when I am anxious A Few Times a Week   I eat when I am happy or as a reward Less Than Weekly   I feel hungry all the time even if I just have eaten A Few Times a Week   Feeling full is important to me Never   I finish all the food on my plate even if I am already full Once a Week   I can't resist eating delicious food or walk past the good food/smell A Few Times a Week   I eat/snack without noticing that I am eating A Few Times a Week   I eat when I am preparing the meal Less Than Weekly   I eat more than usual when I see others eating A Few Times a Week   I have trouble not eating sweets, ice cream, cookies, or chips if they are around the house Almost Everyday   I think about food all day Everyday   What foods, if any, do you crave? Chips/Crackers   Please list any other foods you crave? Cheesy things, anything that can be dipped           9/11/2023    11:20 AM   Amount of Food   I feel out of control when eating Weekly   I eat a large amount of food, like a loaf of bread, a box of cookies, a pint/quart of ice cream, all at once Weekly   I eat a large amount of food even when I am not hungry Almost Everyday   I eat rapidly Everyday   I eat alone because I feel embarrassed and do not want others to see how much I have eaten  Weekly   I eat until I am uncomfortably full Weekly   I feel bad, disgusted, or guilty after I overeat Weekly       Physical Activity:        9/11/2023    11:20 AM   Activity/Exercise History   How much of a typical 12 hour day do you spend sitting? Half the Day   How much of a typical 12 hour day do you spend lying down? Less Than Half the Day   How much of a typical day do you spend walking/standing? Less Than Half the Day   How many hours (not including work) do you spend on the TV/Video Games/Computer/Tablet/Phone? 4-5 Hours   How many times a week are you active for the purpose of exercise? 2-3 Times a Week   What keeps you from being more active? Lack of Time    Too tired    Other   How many total minutes do you spend doing some activity for the purpose of exercising when you exercise? More Than 30 Minutes       Nutrition Prescription  Recommended energy/nutrient modification.      Nutrition Diagnosis  Obesity r/t long history of positive energy balance aeb BMI >30.    Nutrition Intervention  Reviewed and modified plan  Answered pt questions  Coordination of care   Nutrition education   AVS and handouts via [employer]    Expected Engagement: good    Nutrition Goals/Resources:  Aim for one fruit per day or a 2nd serving of vegetables  Continue aiming for protein with every meal and with at least one snack per day  Continue with something small at breakfast vs. Skipping  Consider eating protein first at meal time if finding it hard to get all the food groups in     Protein powder examples:   - Orgain   - Isopure   - Genepro   - Premier   - Garden of life   - Beneprotein        Fast, easy meal components/ideas:    - String Cheese    - Hard boiled eggs    - Tuna    - Shrimp     - Roit chicken    - Pre-made chicken in refrigerator section (plain or flavored, example: [employer] Marlyn Masala Chicken)     - Frozen chicken     - Deli meat    - Wraps or sandwiches     - Vegetables with hummus OR another dip    - Ready  to go grain packs     - Protein bars    - Protein shakes     - Fruit    - Salad kits    - Chicken sausage links     - Smoothies    - Consider Barilla Protein + pasta over regular       Follow-Up:  Thursday, February 15th at 9:30 am  Thursday, March 14th at 9:00 am    Time spent with patient: 12 minutes.  Emilia Weiss, AGUSTINAD, RD, LD

## 2024-01-11 NOTE — NURSING NOTE
Is the patient currently in the state of [place]? YES    Visit mode:VIDEO    If the visit is dropped, the patient can be reconnected by: VIDEO VISIT: Text to cell phone:   Telephone Information:   Mobile [phone]       Will anyone else be joining the visit? NO  (If patient encounters technical issues they should call [phone])    How would you like to obtain your AVS? MyChart    Are changes needed to the allergy or medication list? No    Reason for visit: RECHECK    Kira SPRINGER

## 2024-01-31 ENCOUNTER — MYC REFILL (OUTPATIENT)
Dept: FAMILY MEDICINE | Facility: CLINIC | Age: 44
End: 2024-01-31
Payer: COMMERCIAL

## 2024-01-31 DIAGNOSIS — J98.01 BRONCHOSPASM: Primary | ICD-10-CM

## 2024-01-31 RX ORDER — ALBUTEROL SULFATE 90 UG/1
2 AEROSOL, METERED RESPIRATORY (INHALATION) EVERY 6 HOURS PRN
Qty: 18 G | Refills: 3 | Status: SHIPPED | OUTPATIENT
Start: 2024-01-31

## 2024-03-02 ENCOUNTER — HEALTH MAINTENANCE LETTER (OUTPATIENT)
Age: 44
End: 2024-03-02

## 2024-03-11 ENCOUNTER — TELEPHONE (OUTPATIENT)
Dept: ENDOCRINOLOGY | Facility: CLINIC | Age: 44
End: 2024-03-11
Payer: COMMERCIAL

## 2024-03-11 ENCOUNTER — VIRTUAL VISIT (OUTPATIENT)
Dept: CARDIOLOGY | Facility: CLINIC | Age: 44
End: 2024-03-11
Attending: INTERNAL MEDICINE
Payer: COMMERCIAL

## 2024-03-11 VITALS — BODY MASS INDEX: 49.87 KG/M2 | WEIGHT: 293 LBS

## 2024-03-11 DIAGNOSIS — J45.20 MILD INTERMITTENT ASTHMA WITHOUT COMPLICATION: ICD-10-CM

## 2024-03-11 DIAGNOSIS — F41.1 GENERALIZED ANXIETY DISORDER: ICD-10-CM

## 2024-03-11 DIAGNOSIS — E66.813 CLASS 3 SEVERE OBESITY DUE TO EXCESS CALORIES IN ADULT, UNSPECIFIED BMI, UNSPECIFIED WHETHER SERIOUS COMORBIDITY PRESENT (H): Primary | ICD-10-CM

## 2024-03-11 DIAGNOSIS — K21.9 GERD WITHOUT ESOPHAGITIS: ICD-10-CM

## 2024-03-11 DIAGNOSIS — E66.9 OBESITY, UNSPECIFIED CLASSIFICATION, UNSPECIFIED OBESITY TYPE, UNSPECIFIED WHETHER SERIOUS COMORBIDITY PRESENT: Primary | ICD-10-CM

## 2024-03-11 DIAGNOSIS — E66.01 CLASS 3 SEVERE OBESITY DUE TO EXCESS CALORIES IN ADULT, UNSPECIFIED BMI, UNSPECIFIED WHETHER SERIOUS COMORBIDITY PRESENT (H): Primary | ICD-10-CM

## 2024-03-11 RX ORDER — FAMOTIDINE 20 MG/1
TABLET, FILM COATED ORAL
Qty: 30 TABLET | Refills: 3 | Status: SHIPPED | OUTPATIENT
Start: 2024-03-11 | End: 2024-06-28

## 2024-03-11 ASSESSMENT — PAIN SCALES - GENERAL: PAINLEVEL: NO PAIN (0)

## 2024-03-11 NOTE — NURSING NOTE
Is the patient currently in the state of [place]? YES    Visit mode:VIDEO    If the visit is dropped, the patient can be reconnected by: VIDEO VISIT: Text to cell phone:   Telephone Information:   Mobile [phone]       Will anyone else be joining the visit? NO  (If patient encounters technical issues they should call [phone])    How would you like to obtain your AVS? MyChart    Are changes needed to the allergy or medication list? No, Pt stated no changes to allergies, and Pt stated no med changes    Reason for visit: RECHECK (MT)    Delia SPRINGER

## 2024-03-11 NOTE — PROGRESS NOTES
Medication Therapy Management (MTM) Encounter    ASSESSMENT:                            Medication Adherence/Access: No issues identified    Weight Management:   Needs improvement. Due to side effects, patient would benefit from dose decrease of Wegovy to 1 mg weekly.     GERD:   Unimproved, due to GERD symptoms at baseline even prior to GLP1 agonist patient likely needs to be on daily acid reducer. Due to symptoms being mainly through the night or upon awakening, will trial famotidine 10-20 mg nightly. Hopefully decreasing Wegovy dose will also improve this.     Anxiety:  Stable from patient perspective. Due for questionnaires.     Asthma:   Stable from patient perspective.     PLAN:                            Decrease Wegovy to 1 mg weekly.   Pepcid (famotidine) 10-20 mg daily bedtime   RX sent into pharmacy for both.    3. Complete questionnaires via [employer], PHQ9 and GAD7, to check in on the status of your mood.     Follow-up: Return in about 6 weeks (around 4/22/2024) for Medication Therapy Management Pharmacist Visit, (scheduled today).    SUBJECTIVE/OBJECTIVE:                          Digna Jacome is a 43 year old female contacted via secure video for an initial visit. She was referred to me from Dr. Kamaljit Stephens.      Reason for visit: Check in on switch from Saxenda to Wegovy.    Allergies/ADRs: Reviewed in chart  Past Medical History: Reviewed in chart  Tobacco: She reports that she quit smoking about 13 years ago. Her smoking use included cigarettes. She started smoking about 24 years ago. She has never used smokeless tobacco.  Alcohol: 1-3 beverages / week  Caffeine: 1 can coke zero per day     Medication Adherence/Access: no issues reported    Obesity   Weight Management:   Wegovy 1.7 mg once weekly     Patient reports the following medication side effects: daily heartburn, intermittent diarrhea, stomach upset. Transitioned from Saxenda 3 mg daily to Wegovy 1.7 mg weekly. She has  "gastrointestinal issues at baseline.  Now taking Tums 6-8 chews per day for heartburn. See more info below. Does feel Wegovy has worsened GERD symptoms and intermittent diarrhea. Diarrhea at baseline is normally from food choices and tries to monitor food choices to minimize issues.   Nutrition/Eating Habits: has been doing 4-6 small meals per day, transitioned to this when starting injectable as she is alvarado more quickly.    Exercise/Activity: has been stable, has been working to improve by walking dogs daily, sometimes longer walks.   Medication History:  Saxenda: lack of efficacy     Wt Readings from Last 4 Encounters:   03/11/24 148.8 kg (328 lb)   01/11/24 (!) 153.3 kg (338 lb)   01/09/24 (!) 154.2 kg (340 lb)   12/14/23 (!) 155.4 kg (342 lb 9.6 oz)     Estimated body mass index is 49.87 kg/m  as calculated from the following:    Height as of 1/11/24: 1.727 m (5' 8\").    Weight as of this encounter: 148.8 kg (328 lb).    GERD:   Tums 6-8 chews daily    Patient reports heartburn, nocturnal burning, symptoms occur at night, symptoms primarily relate to meals, and lying down after meals.   The patient does not have a history of GI bleed.  Reports that acidic foods can cause stomach upset and heartburn at baseline. She would take tums daily at bedtime as she would wake in the night due to heartburn or in AM otherwise. She did a 2 week course of omeprazole and heartburn improved, as soon as stopped heartburn returned. Triggersome foods, even minor amounts, is causing side effects.    Anxiety:  Escitalopram 20mg once daily  Alprazolam 0.25 mg as needed     Hasn't used alprazolam in over a year. Helpful to know she has something in case necessary. Feels mood is really good, no concerns.   Patient reports no current medication side effects.    Asthma:   Albuterol (ProAir/Ventolin/Proventil)    Patient reports no current medication side effects.  Has never needed daily maintenance inhaler. Just needs during seasonal " changes as needed, then resolves. No issues of bronchitis this last winter.   Triggers include: pollens and cold air.  Patient reports the following symptoms: none.    Today's Vitals: Wt 148.8 kg (328 lb)   BMI 49.87 kg/m    ----------------    I spent 20 minutes with this patient today. All changes were made via collaborative practice agreement with Dr. Kamaljit Stephens. A copy of the visit note was provided to the patient's provider(s).    A summary of these recommendations was sent via [employer].    Lauren Bloch, PharmD, BCACP   Medication Therapy Management Pharmacist   [facility]    Telemedicine Visit Details  Type of service:  Video Conference via [employer]  Start Time:  9:32 AM  End Time:  9:52 AM     Medication Therapy Recommendations  Class 3 severe obesity due to excess calories in adult, unspecified BMI, unspecified whether serious comorbidity present (H)    Current Medication: Semaglutide-Weight Management (WEGOVY) 1.7 MG/0.75ML pen (Discontinued)   Rationale: Dose too high - Dosage too high - Safety   Recommendation: Decrease Dose - Wegovy 1 MG/0.5ML Soaj   Status: Accepted per CPA         GERD without esophagitis    Rationale: Untreated condition - Needs additional medication therapy - Indication   Recommendation: Start Medication - famotidine 20 MG tablet   Status: Accepted per CPA

## 2024-03-11 NOTE — TELEPHONE ENCOUNTER
MTM referral placed due to [facility] Medication Therapy Management (MTM) practice shift. CPA with Dr. Kamaljit Stephens.

## 2024-03-11 NOTE — PATIENT INSTRUCTIONS
"Recommendations from today's MTM visit:                                                    MTM (medication therapy management) is a service provided by a clinical pharmacist designed to help you get the most of out of your medicines.   Today we reviewed what your medicines are for, how to know if they are working, that your medicines are safe and how to make your medicine regimen as easy as possible.      Decrease Wegovy to 1 mg weekly.   Pepcid (famotidine) 10-20 mg daily bedtime   RX sent into pharmacy for both.    3. Complete questionnaires via [employer], PHQ9 and GAD7, to check in on the status of your mood.     Follow-up: Return in about 6 weeks (around 4/22/2024) for Medication Therapy Management Pharmacist Visit, (scheduled today).    It was great speaking with you today.  I value your experience and would be very thankful for your time in providing feedback in our clinic survey. In the next few days, you may receive an email or text message from [employer] with a link to a survey related to your  clinical pharmacist.\"     To schedule another MTM appointment, please call the clinic directly or you may call the MTM scheduling line at [phone] or toll-free at [phone].     My Clinical Pharmacist's contact information:                                                      Please feel free to contact me with any questions or concerns you have.      Lauren Bloch, PharmD, BCACP   Medication Therapy Management Pharmacist   [facility]       "

## 2024-03-11 NOTE — LETTER
3/11/2024      RE: Digna Jacome  [address]  [place] [address]       Dear Colleague,    Thank you for the opportunity to participate in the care of your patient, Digna Jacome, at the [facility]. Please see a copy of my visit note below.    Medication Therapy Management (MTM) Encounter    ASSESSMENT:                            Medication Adherence/Access: No issues identified    Weight Management:   Needs improvement. Due to side effects, patient would benefit from dose decrease of Wegovy to 1 mg weekly.     GERD:   Unimproved, due to GERD symptoms at baseline even prior to GLP1 agonist patient likely needs to be on daily acid reducer. Due to symptoms being mainly through the night or upon awakening, will trial famotidine 10-20 mg nightly. Hopefully decreasing Wegovy dose will also improve this.     Anxiety:  Stable from patient perspective. Due for questionnaires.     Asthma:   Stable from patient perspective.     PLAN:                            Decrease Wegovy to 1 mg weekly.   Pepcid (famotidine) 10-20 mg daily bedtime   RX sent into pharmacy for both.    3. Complete questionnaires via [employer], PHQ9 and GAD7, to check in on the status of your mood.     Follow-up: Return in about 6 weeks (around 4/22/2024) for Medication Therapy Management Pharmacist Visit, (scheduled today).    SUBJECTIVE/OBJECTIVE:                          Digna Jacome is a 43 year old female contacted via secure video for an initial visit. She was referred to me from Dr. Kamaljit Stephens.      Reason for visit: Check in on switch from Saxenda to Wegovy.    Allergies/ADRs: Reviewed in chart  Past Medical History: Reviewed in chart  Tobacco: She reports that she quit smoking about 13 years ago. Her smoking use included cigarettes. She started smoking about 24 years ago. She has never used smokeless tobacco.  Alcohol: 1-3 beverages /  "week  Caffeine: 1 can coke zero per day     Medication Adherence/Access: no issues reported    Obesity  Weight Management:   Wegovy 1.7 mg once weekly     Patient reports the following medication side effects: daily heartburn, intermittent diarrhea, stomach upset. Transitioned from Saxenda 3 mg daily to Wegovy 1.7 mg weekly. She has gastrointestinal issues at baseline.  Now taking Tums 6-8 chews per day for heartburn. See more info below. Does feel Wegovy has worsened GERD symptoms and intermittent diarrhea. Diarrhea at baseline is normally from food choices and tries to monitor food choices to minimize issues.   Nutrition/Eating Habits: has been doing 4-6 small meals per day, transitioned to this when starting injectable as she is alvarado more quickly.    Exercise/Activity: has been stable, has been working to improve by walking dogs daily, sometimes longer walks.   Medication History:  Saxenda: lack of efficacy     Wt Readings from Last 4 Encounters:   03/11/24 148.8 kg (328 lb)   01/11/24 (!) 153.3 kg (338 lb)   01/09/24 (!) 154.2 kg (340 lb)   12/14/23 (!) 155.4 kg (342 lb 9.6 oz)     Estimated body mass index is 49.87 kg/m  as calculated from the following:    Height as of 1/11/24: 1.727 m (5' 8\").    Weight as of this encounter: 148.8 kg (328 lb).    GERD:   Tums 6-8 chews daily    Patient reports heartburn, nocturnal burning, symptoms occur at night, symptoms primarily relate to meals, and lying down after meals.   The patient does not have a history of GI bleed.  Reports that acidic foods can cause stomach upset and heartburn at baseline. She would take tums daily at bedtime as she would wake in the night due to heartburn or in AM otherwise. She did a 2 week course of omeprazole and heartburn improved, as soon as stopped heartburn returned. Triggersome foods, even minor amounts, is causing side effects.    Anxiety:  Escitalopram 20mg once daily  Alprazolam 0.25 mg as needed     Hasn't used alprazolam in over " a year. Helpful to know she has something in case necessary. Feels mood is really good, no concerns.   Patient reports no current medication side effects.    Asthma:   Albuterol (ProAir/Ventolin/Proventil)    Patient reports no current medication side effects.  Has never needed daily maintenance inhaler. Just needs during seasonal changes as needed, then resolves. No issues of bronchitis this last winter.   Triggers include: pollens and cold air.  Patient reports the following symptoms: none.    Today's Vitals: Wt 148.8 kg (328 lb)   BMI 49.87 kg/m    ----------------    I spent 20 minutes with this patient today. All changes were made via collaborative practice agreement with Dr. Kamaljit Stephens. A copy of the visit note was provided to the patient's provider(s).    A summary of these recommendations was sent via [employer].    Lauren Bloch, PharmD, BCACP   Medication Therapy Management Pharmacist   [facility]    Telemedicine Visit Details  Type of service:  Video Conference via [employer]  Start Time:  9:32 AM  End Time:  9:52 AM     Medication Therapy Recommendations  Class 3 severe obesity due to excess calories in adult, unspecified BMI, unspecified whether serious comorbidity present (H)    Current Medication: Semaglutide-Weight Management (WEGOVY) 1.7 MG/0.75ML pen (Discontinued)   Rationale: Dose too high - Dosage too high - Safety   Recommendation: Decrease Dose - Wegovy 1 MG/0.5ML Soaj   Status: Accepted per CPA         GERD without esophagitis    Rationale: Untreated condition - Needs additional medication therapy - Indication   Recommendation: Start Medication - famotidine 20 MG tablet   Status: Accepted per CPA                Please do not hesitate to contact me if you have any questions/concerns.     Sincerely,     Lauren T. Bloch, [facility]

## 2024-03-11 NOTE — PROGRESS NOTES
"Virtual Visit Details    Type of service:  Video Visit     Originating Location (pt. Location): {video visit patient location:180568::\"Home\"}  {PROVIDER LOCATION On-site should be selected for visits conducted from your clinic location or adjoining [facility] hospital, academic office, or other nearby [facility] building. Off-site should be selected for all other provider locations, including home:095505}  Distant Location (provider location):  {virtual location provider:148010}  Platform used for Video Visit: {Virtual Visit Platforms:564546::\"[employer]\"}  "

## 2024-03-14 ENCOUNTER — TELEPHONE (OUTPATIENT)
Dept: ENDOCRINOLOGY | Facility: CLINIC | Age: 44
End: 2024-03-14

## 2024-03-14 NOTE — TELEPHONE ENCOUNTER
Left Voicemail (1st Attempt), sent myc for the patient to call back and reschedule the following:    Appointment type: Return Med Wt Mgmt Nutrition  Provider: Emilia Gan  Return date: Reschedule 3/14 Appt due to the provider being out  Specialty phone number: [phone]  Additional appointment(s) needed: n/a  Additonal Notes: Reschedule to next available with Trena Nieto or Wendy Cheatham    Left CC#

## 2024-04-22 ENCOUNTER — VIRTUAL VISIT (OUTPATIENT)
Dept: CARDIOLOGY | Facility: CLINIC | Age: 44
End: 2024-04-22
Attending: INTERNAL MEDICINE
Payer: COMMERCIAL

## 2024-04-22 VITALS — BODY MASS INDEX: 49.11 KG/M2 | WEIGHT: 293 LBS

## 2024-04-22 DIAGNOSIS — K21.9 GERD WITHOUT ESOPHAGITIS: Primary | ICD-10-CM

## 2024-04-22 DIAGNOSIS — F41.1 GENERALIZED ANXIETY DISORDER: ICD-10-CM

## 2024-04-22 DIAGNOSIS — E66.813 CLASS 3 SEVERE OBESITY DUE TO EXCESS CALORIES IN ADULT, UNSPECIFIED BMI, UNSPECIFIED WHETHER SERIOUS COMORBIDITY PRESENT (H): ICD-10-CM

## 2024-04-22 DIAGNOSIS — E66.01 CLASS 3 SEVERE OBESITY DUE TO EXCESS CALORIES IN ADULT, UNSPECIFIED BMI, UNSPECIFIED WHETHER SERIOUS COMORBIDITY PRESENT (H): ICD-10-CM

## 2024-04-22 ASSESSMENT — PAIN SCALES - GENERAL: PAINLEVEL: NO PAIN (0)

## 2024-04-22 NOTE — LETTER
4/22/2024      RE: Digna Jacome  [address]  [place] [address]       Dear Colleague,    Thank you for the opportunity to participate in the care of your patient, iDgna Jacome, at the [facility] HEART [facility]. Please see a copy of my visit note below.    Medication Therapy Management (MTM) Encounter    ASSESSMENT:                            Medication Adherence/Access: No issues identified    Weight Management:   Would benefit from staying at Wegovy 1 mg weekly for now due to effective response and improved side effects with lowering from Wegovy 1.7 mg weekly. Due to baseline gastrointestinal issues along with worsening on Wegovy but wanting to remain on Wegovy, could benefit from starting Citrucel daily.     GERD:   Improved.     Anxiety:    Stable.     PLAN:                            Start Citrucel 1 serving once daily   Continue Wegovy at 1 mg weekly for now. Will send in 3 month supply to [facility].     Follow-up: Return in about 4 months (around 8/22/2024) for Medication Therapy Management Pharmacist Visit.    SUBJECTIVE/OBJECTIVE:                          Digna Jacome is a 43 year old female contacted via secure video for a follow-up visit.       Reason for visit: Wegovy decrease dose check in.    Allergies/ADRs: Reviewed in chart  Past Medical History: Reviewed in chart  Tobacco: She reports that she quit smoking about 13 years ago. Her smoking use included cigarettes. She started smoking about 24 years ago. She has never used smokeless tobacco.    Medication Adherence/Access: no issues reported    Obesity  Weight Management:   Wegovy 1 mg once weekly     Patient reports the following medication side effects: heartburn issues largely improved, diarrhea and upset stomach in AM still but improved. Does notice with decreasing Wegovy 1.7 mg to 1 mg weekly due to unmanageable gastrointestinal issues. She  "does have some food noise, but manageable. Working on mindful eating. If she still eats triggersome food will still have heartburn. Reports that at baseline she does have as needed diarrhea and relates to food. Also finds when eating higher carbohydrate meals, will have more diarrhea. She wants to stay on Wegovy because it is working well. \"Feels normal with food\".   Nutrition/Eating Habits: has been doing 2-3 meals per day. Describes protein with meals is good. Working with dietitian. Gets in 2 vegetables + 1 fruit daily. Behavioral modification techniques.   Exercise/Activity: has been stable, has been working to improve by walking dogs daily, sometimes longer walks.   Medication History:  Saxenda: lack of efficacy    Weight today: 323 lb   Initial Consult Weight: 345.2 lb   Cumulative Weight Loss: -22.2 lb, -6.43%        Wt Readings from Last 4 Encounters:   04/22/24 146.5 kg (323 lb)   03/11/24 148.8 kg (328 lb)   01/11/24 (!) 153.3 kg (338 lb)   01/09/24 (!) 154.2 kg (340 lb)     Estimated body mass index is 49.11 kg/m  as calculated from the following:    Height as of 1/11/24: 1.727 m (5' 8\").    Weight as of this encounter: 146.5 kg (323 lb).    GERD:   Famotidine 20-40 mg nightly     Patient reports heartburn but improved on the above regimen. Has food triggers and tries to minimize.   Patient feels that current regimen is effective.  The patient does not have a history of GI bleed.    Anxiety:    Escitalopram 20mg once daily     Patient reports symptoms are stable. She feels that her anxiety is well managed. Does have a very stressful job. When has more stress, does have greater gastrointestinal issues. Works with therapist. No \"active anxiety symptoms\". Higher stress life.     Today's Vitals: Wt 146.5 kg (323 lb)   BMI 49.11 kg/m    ----------------      I spent 20 minutes with this patient today. All changes were made via collaborative practice agreement with Dr. Kamaljit Stephens. A copy of the " visit note was provided to the patient's provider(s).    A summary of these recommendations was sent via [employer].    Lauren Bloch, PharmD, BCACP   Medication Therapy Management Pharmacist   [facility]    Telemedicine Visit Details  Type of service:  Video Conference via [employer]  Start Time:  9:41 AM  End Time:  10:01 AM     Medication Therapy Recommendations  Class 3 severe obesity due to excess calories in adult, unspecified BMI, unspecified whether serious comorbidity present (H)    Current Medication: Semaglutide-Weight Management (WEGOVY) 1 MG/0.5ML pen   Rationale: Undesirable effect - Adverse medication event - Safety   Recommendation: Start Medication - Citrucel Powd   Status: Accepted - no CPA Needed                Please do not hesitate to contact me if you have any questions/concerns.     Sincerely,     Lauren T. Bloch, RPH

## 2024-04-22 NOTE — PROGRESS NOTES
Medication Therapy Management (MTM) Encounter    ASSESSMENT:                            Medication Adherence/Access: No issues identified    Weight Management:   Would benefit from staying at Wegovy 1 mg weekly for now due to effective response and improved side effects with lowering from Wegovy 1.7 mg weekly. Due to baseline gastrointestinal issues along with worsening on Wegovy but wanting to remain on Wegovy, could benefit from starting Citrucel daily.     GERD:   Improved.     Anxiety:    Stable.     PLAN:                            Start Citrucel 1 serving once daily   Continue Wegovy at 1 mg weekly for now. Will send in 3 month supply to [facility].     Follow-up: Return in about 4 months (around 8/22/2024) for Medication Therapy Management Pharmacist Visit.    SUBJECTIVE/OBJECTIVE:                          Digna Jacome is a 43 year old female contacted via secure video for a follow-up visit.       Reason for visit: Wegovy decrease dose check in.    Allergies/ADRs: Reviewed in chart  Past Medical History: Reviewed in chart  Tobacco: She reports that she quit smoking about 13 years ago. Her smoking use included cigarettes. She started smoking about 24 years ago. She has never used smokeless tobacco.    Medication Adherence/Access: no issues reported    Obesity   Weight Management:   Wegovy 1 mg once weekly     Patient reports the following medication side effects: heartburn issues largely improved, diarrhea and upset stomach in AM still but improved. Does notice with decreasing Wegovy 1.7 mg to 1 mg weekly due to unmanageable gastrointestinal issues. She does have some food noise, but manageable. Working on mindful eating. If she still eats triggersome food will still have heartburn. Reports that at baseline she does have as needed diarrhea and relates to food. Also finds when eating higher carbohydrate meals, will have more diarrhea. She wants to stay on Wegovy because it is working  "well. \"Feels normal with food\".   Nutrition/Eating Habits: has been doing 2-3 meals per day. Describes protein with meals is good. Working with dietitian. Gets in 2 vegetables + 1 fruit daily. Behavioral modification techniques.   Exercise/Activity: has been stable, has been working to improve by walking dogs daily, sometimes longer walks.   Medication History:  Saxenda: lack of efficacy    Weight today: 323 lb   Initial Consult Weight: 345.2 lb   Cumulative Weight Loss: -22.2 lb, -6.43%        Wt Readings from Last 4 Encounters:   04/22/24 146.5 kg (323 lb)   03/11/24 148.8 kg (328 lb)   01/11/24 (!) 153.3 kg (338 lb)   01/09/24 (!) 154.2 kg (340 lb)     Estimated body mass index is 49.11 kg/m  as calculated from the following:    Height as of 1/11/24: 1.727 m (5' 8\").    Weight as of this encounter: 146.5 kg (323 lb).    GERD:   Famotidine 20-40 mg nightly     Patient reports heartburn but improved on the above regimen. Has food triggers and tries to minimize.   Patient feels that current regimen is effective.  The patient does not have a history of GI bleed.    Anxiety:    Escitalopram 20mg once daily     Patient reports symptoms are stable. She feels that her anxiety is well managed. Does have a very stressful job. When has more stress, does have greater gastrointestinal issues. Works with therapist. No \"active anxiety symptoms\". Higher stress life.     Today's Vitals: Wt 146.5 kg (323 lb)   BMI 49.11 kg/m    ----------------      I spent 20 minutes with this patient today. All changes were made via collaborative practice agreement with Dr. Kamaljit Stephens. A copy of the visit note was provided to the patient's provider(s).    A summary of these recommendations was sent via [employer].    Lauren Bloch, PharmD, BCACP   Medication Therapy Management Pharmacist   [facility]    Telemedicine Visit Details  Type of service:  Video Conference via [employer]  Start Time:  " 9:41 AM  End Time:  10:01 AM     Medication Therapy Recommendations  Class 3 severe obesity due to excess calories in adult, unspecified BMI, unspecified whether serious comorbidity present (H)    Current Medication: Semaglutide-Weight Management (WEGOVY) 1 MG/0.5ML pen   Rationale: Undesirable effect - Adverse medication event - Safety   Recommendation: Start Medication - Citrucel Powd   Status: Accepted - no CPA Needed

## 2024-04-22 NOTE — PATIENT INSTRUCTIONS
"Recommendations from today's MTM visit:                                                    MTM (medication therapy management) is a service provided by a clinical pharmacist designed to help you get the most of out of your medicines.      Start Citrucel 1 serving once daily   Continue Wegovy at 1 mg weekly for now. Will send in 3 month supply to [facility].     Follow-up: Return in about 4 months (around 8/22/2024) for Medication Therapy Management Pharmacist Visit.    It was great speaking with you today.  I value your experience and would be very thankful for your time in providing feedback in our clinic survey. In the next few days, you may receive an email or text message from [facility] c-crowd with a link to a survey related to your  clinical pharmacist.\"     To schedule another MTM appointment, please call the clinic directly or you may call the MTM scheduling line at [phone] or toll-free at [phone].     My Clinical Pharmacist's contact information:                                                      Please feel free to contact me with any questions or concerns you have.      Lauren Bloch, PharmD, BCACP   Medication Therapy Management Pharmacist   [facility]       "

## 2024-04-22 NOTE — NURSING NOTE
Is the patient currently in the state of [place]? YES    Visit mode:VIDEO    If the visit is dropped, the patient can be reconnected by: VIDEO VISIT: Text to cell phone:   Telephone Information:   Mobile [phone]    and VIDEO VISIT: Send to e-mail at: [email or web address]    Will anyone else be joining the visit? NO  (If patient encounters technical issues they should call [phone])    How would you like to obtain your AVS? MyChart    Are changes needed to the allergy or medication list? No    Are refills needed on medications prescribed by this physician? NO    Reason for visit: RECHECK    Kira SPRINGER

## 2024-04-29 NOTE — PROGRESS NOTES
"Video-Visit Details    Type of service:  Video Visit    Video Start Time:  9:34 am  Video End Time:  9:56 am    Originating Location (pt. Location): Home    Distant Location (provider location):  Offsite (providers home)     Platform used for Video Visit: Smarte[employer]      Weight Management Nutrition Consultation    Digna Jacome is a 43 year old female presents today for weight management nutrition consultation.  Patient referred by Dr. Mari on 2023.    Patient with Co-morbidities of obesity includin/11/2023    11:20 AM   --   I have the following health issues associated with obesity Pre-Diabetes    High Blood Pressure    Asthma   I have the following symptoms associated with obesity Depression    Back Pain    Hip Pain         Anthropometrics:  Weight 23: 354 lbs with BMI 53.04    Estimated body mass index is 49.11 kg/m  as calculated from the following:    Height as of this encounter: 1.727 m (5' 8\").    Weight as of this encounter: 146.5 kg (323 lb).    Current weight: 323 lbs, pt report   - Down 15 lbs since last RD visit 24    Medications for Weight Loss:  Saxenda - Now on Wegovy      NUTRITION HISTORY  Food allergies: NKFA  Does eat meat/fish/seafood - chicken mostly. Prefers fish cooked by others (feels she struggles to make it taste less fishy)  Food intolerances: doesn't feel good eating higher sugar/higher carb  Endorses being a picky eater  Previous methods of diet modification for weight loss: keto  RD before: No, WW, nutrisource    Pt goals: make better food choices    Meal preps but feels sick of choices come Thur/Friday and prefers to order take out.  Tends to be an all or nothing person per pt.    Typically eats 2-3 meals/day.   Feels she does well during the day with listening to hunger/satiety but harder in evenings (stress, anxiety, not as busy)    PA: pickle ball, hiking, walking dog    Please see previous RD notes for more information.    Dec 2023:    Things going well " overall. Meeting goals most days aside from occ when traveling, etc.    Typically doing 2-3 small meals/day and 2 snacks.    Started Saxenda 2-3 weeks ago - not as hungry, smaller portions. Noticing she is eating more carbohydrates on the weekends in particular.      Snacks: string cheese, apple, protein shakes    PA: more active with nicer weather    Jan 2024:    Continues to notice benefit from Saxenda - less food noise and hunger. Added in more protein/vegetables and less carbohydrates.     Hydration: 100+ oz/day water, 1 coke zero/day, almond milk in protein shake, alcohol a couple times/week   - Finds if getting 120 oz is hard to drink enough the next day    PA: limited due to recent psoas muscle injury (has been going to chiro and doing salt bathes/massages/stretches at home)    April 2024:  Pt no showed visit 2/15 and visit 3/14 had to be rescheduled due to this provider being out sick.     Doing well overall. Saw [facility] 4/22 for med check. Was having lots of GI issues. Doing much better now  - GI issues at baseline. Plan to add in cirtucel daily to help with GI concerns - has not started yet but plans to this week.    Eating 2-3 meals/day. Protein focused.   Getting 2 vegetables and 1 fruit daily.   Lots of sandwiches lately - grilled turkey/cheese.  Doing well listening to hunger/satiety.     Busy at work - working 60+ hr weeks currently. Lots of grab and go. Meal might be more snack based such as string cheese OR balanced break with apple/orange OR granola bar.     Hydration - no concerns, lots of water, 1 coke zero/day. Occ cocktails. [place] milk in protein shake    PA: walking dogs, gym occ (had two weeks she went 4x/week but harder with busy season at work)     Previous goals:  Aim for one fruit per day or a 2nd serving of vegetables - Met, getting 2 servings of vegetables and 1+ fruit about 80% of the time.   Continue aiming for protein with every meal and with at least one snack per day - Met, will  add protein shake occ. Example: had a couple pieces of pizza and protein shake last night.  Continue with something small at breakfast vs. Skipping - Going well overall. Occ has breakfast/lunch time meal then dinner. Adrian  Consider eating protein first at meal time if finding it hard to get all the food groups in - Met    Additional information:  FT - , Child Protection          9/11/2023    11:20 AM   Diet Recall Review with Patient   If you do eat breakfast, what types of food do you eat? Protein shake or toast or fruit   If you do eat lunch, what types of food do you typically eat? [place] and fruit or popcorn or leftovers   If you do eat supper, what types of food do you typically eat? Leftovers or chicken/rice or delivery or popcorn   If you do snack, what types of food do you typically eat? Popcorn   How many glasses of juice do you drink in a typical day? 0   How many of glasses of milk do you drink in a typical day? 0   How many 8oz glasses of sugar containing drinks such as Saroj-Aid/sweet tea do you drink in a day? 0   How many cans/bottles of sugar pop/soda/tea/sports drinks do you drink in a day? 0   How many cans/bottles of diet pop/soda/tea or sports drink do you drink in a day? 1   How often do you have a drink of alcohol? 2-4 Times a Month   If you do drink, how many drinks might you have in a day? 1 or 2           9/11/2023    11:20 AM   Eating Habits   Generally, my meals include foods like these bread, pasta, rice, potatoes, corn, crackers, sweet dessert, pop, or juice A Few Times a Week   Generally, my meals include foods like these fried meats, brats, burgers, french fries, pizza, cheese, chips, or ice cream Once a Week   Eat fast food (like McDonalds, Burger Ryan, Taco Bell) Once a Week   Eat at a buffet or sit-down restaurant Once a Week   Eat most of my meals in front of the TV or computer Almost Everyday   Often skip meals, eat at random times, have no regular eating times A Few  Times a Week   Rarely sit down for a meal but snack or graze throughout Less Than Weekly   Eat extra snacks between meals Less Than Weekly   Eat most of my food at the end of the day Almost Everyday   Eat in the middle of the night or wake up at night to eat Never   Eat extra snacks to prevent or correct low blood sugar Never   Eat to prevent acid reflux or stomach pain Never   Worry about not having enough food to eat Never   I eat when I am depressed Less Than Weekly   I eat when I am stressed A Few Times a Week   I eat when I am bored A Few Times a Week   I eat when I am anxious A Few Times a Week   I eat when I am happy or as a reward Less Than Weekly   I feel hungry all the time even if I just have eaten A Few Times a Week   Feeling full is important to me Never   I finish all the food on my plate even if I am already full Once a Week   I can't resist eating delicious food or walk past the good food/smell A Few Times a Week   I eat/snack without noticing that I am eating A Few Times a Week   I eat when I am preparing the meal Less Than Weekly   I eat more than usual when I see others eating A Few Times a Week   I have trouble not eating sweets, ice cream, cookies, or chips if they are around the house Almost Everyday   I think about food all day Everyday   What foods, if any, do you crave? Chips/Crackers   Please list any other foods you crave? Cheesy things, anything that can be dipped           9/11/2023    11:20 AM   Amount of Food   I feel out of control when eating Weekly   I eat a large amount of food, like a loaf of bread, a box of cookies, a pint/quart of ice cream, all at once Weekly   I eat a large amount of food even when I am not hungry Almost Everyday   I eat rapidly Everyday   I eat alone because I feel embarrassed and do not want others to see how much I have eaten Weekly   I eat until I am uncomfortably full Weekly   I feel bad, disgusted, or guilty after I overeat Weekly       Physical  Activity:        9/11/2023    11:20 AM   Activity/Exercise History   How much of a typical 12 hour day do you spend sitting? Half the Day   How much of a typical 12 hour day do you spend lying down? Less Than Half the Day   How much of a typical day do you spend walking/standing? Less Than Half the Day   How many hours (not including work) do you spend on the TV/Video Games/Computer/Tablet/Phone? 4-5 Hours   How many times a week are you active for the purpose of exercise? 2-3 Times a Week   What keeps you from being more active? Lack of Time    Too tired    Other   How many total minutes do you spend doing some activity for the purpose of exercising when you exercise? More Than 30 Minutes       Nutrition Prescription  Recommended energy/nutrient modification.      Nutrition Diagnosis  Obesity r/t long history of positive energy balance aeb BMI >30.    Nutrition Intervention  Reviewed and modified plan  Answered pt questions  Coordination of care   Nutrition education   AVS and handouts via [email or web address]    Expected Engagement: good    Nutrition Goals/Resources:  Continue aiming for 1 fruit per day and 2 servings of vegetables   Continue focusing on protein at meal time. Aim to eat protein first if finding it's hard to get all food groups in  Keep up the hard work with hydration!  Walking dogs as able     Protein powder examples:   - Orgain   - Isopure   - Genepro   - Premier   - Garden of life   - Beneprotein      Fast, easy meal components/ideas:    - String Cheese    - Hard boiled eggs    - Tuna    - Shrimp     - Roit chicken    - Pre-made chicken in refrigerator section (plain or flavored, example: Good & Gather Marlyn Masala Chicken)     - Frozen chicken     - Deli meat    - Wraps or sandwiches     - Vegetables with hummus OR another dip    - Ready to go grain packs     - Protein bars    - Protein shakes     - Fruit    - Salad kits    - Chicken sausage links     - Smoothies    - Consider Barilla Protein + pasta over  regular       Follow-Up:  Monday, June 24th at 8:30 am with Mary     Time spent with patient: 22 minutes.  JEFF Macias, RD, LD

## 2024-04-30 ENCOUNTER — VIRTUAL VISIT (OUTPATIENT)
Dept: ENDOCRINOLOGY | Facility: CLINIC | Age: 44
End: 2024-04-30
Payer: COMMERCIAL

## 2024-04-30 VITALS — HEIGHT: 68 IN | BODY MASS INDEX: 44.41 KG/M2 | WEIGHT: 293 LBS

## 2024-04-30 DIAGNOSIS — Z71.3 NUTRITIONAL COUNSELING: Primary | ICD-10-CM

## 2024-04-30 DIAGNOSIS — E66.9 OBESITY: ICD-10-CM

## 2024-04-30 PROCEDURE — 97803 MED NUTRITION INDIV SUBSEQ: CPT | Mod: 95 | Performed by: DIETITIAN, REGISTERED

## 2024-04-30 PROCEDURE — 99207 PR NO CHARGE LOS: CPT | Mod: 95 | Performed by: DIETITIAN, REGISTERED

## 2024-04-30 ASSESSMENT — PAIN SCALES - GENERAL: PAINLEVEL: NO PAIN (0)

## 2024-04-30 NOTE — NURSING NOTE
Is the patient currently in the state of [place]? YES    Visit mode:VIDEO    If the visit is dropped, the patient can be reconnected by: VIDEO VISIT: Text to cell phone:   Telephone Information:   Mobile [phone]       Will anyone else be joining the visit? NO  (If patient encounters technical issues they should call [phone]    How would you like to obtain your AVS? MyChart    Are changes needed to the allergy or medication list? N/A    Are refills needed on medications prescribed by this physician? NO     Reason for visit: RECHECK    Wt/ht other than 24 hrs:  week ago  Pain more than one location:  no  Diana SPRINGER

## 2024-04-30 NOTE — LETTER
"2024       RE: Digna Jacome  [address]   [facility] [address]     Dear Colleague,    Thank you for referring your patient, Digna Jacome, to the [facility] [place] [facility]. Please see a copy of my visit note below.    Video-Visit Details    Type of service:  Video Visit    Video Start Time:  9:34 am  Video End Time:  9:56 am    Originating Location (pt. Location): Home    Distant Location (provider location):  Offsite (providers home)     Platform used for Video Visit: [employer]      Weight Management Nutrition Consultation    Digna Jacome is a 43 year old female presents today for weight management nutrition consultation.  Patient referred by Dr. Mari on 2023.    Patient with Co-morbidities of obesity includin/11/2023    11:20 AM   --   I have the following health issues associated with obesity Pre-Diabetes    High Blood Pressure    Asthma   I have the following symptoms associated with obesity Depression    Back Pain    Hip Pain         Anthropometrics:  Weight 23: 354 lbs with BMI 53.04    Estimated body mass index is 49.11 kg/m  as calculated from the following:    Height as of this encounter: 1.727 m (5' 8\").    Weight as of this encounter: 146.5 kg (323 lb).    Current weight: 323 lbs, pt report   - Down 15 lbs since last RD visit 24    Medications for Weight Loss:  Saxenda - Now on Wegovy      NUTRITION HISTORY  Food allergies: NKFA  Does eat meat/fish/seafood - chicken mostly. Prefers fish cooked by others (feels she struggles to make it taste less fishy)  Food intolerances: doesn't feel good eating higher sugar/higher carb  Endorses being a picky eater  Previous methods of diet modification for weight loss: keto  RD before: No, WW, nutrisource    Pt goals: make better food choices    Meal preps but feels sick of choices come Thur/Friday and prefers to order take out.  Tends to be " an all or nothing person per pt.    Typically eats 2-3 meals/day.   Feels she does well during the day with listening to hunger/satiety but harder in evenings (stress, anxiety, not as busy)    PA: pickle ball, hiking, walking dog    Please see previous RD notes for more information.    Dec 2023:    Things going well overall. Meeting goals most days aside from occ when traveling, etc.    Typically doing 2-3 small meals/day and 2 snacks.    Started Saxenda 2-3 weeks ago - not as hungry, smaller portions. Noticing she is eating more carbohydrates on the weekends in particular.      Snacks: string cheese, apple, protein shakes    PA: more active with nicer weather    Jan 2024:    Continues to notice benefit from Saxenda - less food noise and hunger. Added in more protein/vegetables and less carbohydrates.     Hydration: 100+ oz/day water, 1 coke zero/day, almond milk in protein shake, alcohol a couple times/week   - Finds if getting 120 oz is hard to drink enough the next day    PA: limited due to recent psoas muscle injury (has been going to chiro and doing salt bathes/massages/stretches at home)    April 2024:  Pt no showed visit 2/15 and visit 3/14 had to be rescheduled due to this provider being out sick.     Doing well overall. Saw [facility] 4/22 for med check. Was having lots of GI issues. Doing much better now  - GI issues at baseline. Plan to add in cirtucel daily to help with GI concerns - has not started yet but plans to this week.    Eating 2-3 meals/day. Protein focused.   Getting 2 vegetables and 1 fruit daily.   Lots of sandwiches lately - grilled turkey/cheese.  Doing well listening to hunger/satiety.     Busy at work - working 60+ hr weeks currently. Lots of grab and go. Meal might be more snack based such as string cheese OR balanced break with apple/orange OR granola bar.     Hydration - no concerns, lots of water, 1 coke zero/day. Occ cocktails. [place] milk in protein shake    PA: walking dogs, gym occ  (had two weeks she went 4x/week but harder with busy season at work)     Previous goals:  Aim for one fruit per day or a 2nd serving of vegetables - Met, getting 2 servings of vegetables and 1+ fruit about 80% of the time.   Continue aiming for protein with every meal and with at least one snack per day - Met, will add protein shake occ. Example: had a couple pieces of pizza and protein shake last night.  Continue with something small at breakfast vs. Skipping - Going well overall. Occ has breakfast/lunch time meal then dinner. Adrian  Consider eating protein first at meal time if finding it hard to get all the food groups in - Met    Additional information:  FT - , Child Protection          9/11/2023    11:20 AM   Diet Recall Review with Patient   If you do eat breakfast, what types of food do you eat? Protein shake or toast or fruit   If you do eat lunch, what types of food do you typically eat? [place] and fruit or popcorn or leftovers   If you do eat supper, what types of food do you typically eat? Leftovers or chicken/rice or delivery or popcorn   If you do snack, what types of food do you typically eat? Popcorn   How many glasses of juice do you drink in a typical day? 0   How many of glasses of milk do you drink in a typical day? 0   How many 8oz glasses of sugar containing drinks such as Saroj-Aid/sweet tea do you drink in a day? 0   How many cans/bottles of sugar pop/soda/tea/sports drinks do you drink in a day? 0   How many cans/bottles of diet pop/soda/tea or sports drink do you drink in a day? 1   How often do you have a drink of alcohol? 2-4 Times a Month   If you do drink, how many drinks might you have in a day? 1 or 2           9/11/2023    11:20 AM   Eating Habits   Generally, my meals include foods like these bread, pasta, rice, potatoes, corn, crackers, sweet dessert, pop, or juice A Few Times a Week   Generally, my meals include foods like these fried meats, brats, burgers, [place]  fries, pizza, cheese, chips, or ice cream Once a Week   Eat fast food (like McDonalds, Burger Ryan, Taco Bell) Once a Week   Eat at a buffet or sit-down restaurant Once a Week   Eat most of my meals in front of the TV or computer Almost Everyday   Often skip meals, eat at random times, have no regular eating times A Few Times a Week   Rarely sit down for a meal but snack or graze throughout Less Than Weekly   Eat extra snacks between meals Less Than Weekly   Eat most of my food at the end of the day Almost Everyday   Eat in the middle of the night or wake up at night to eat Never   Eat extra snacks to prevent or correct low blood sugar Never   Eat to prevent acid reflux or stomach pain Never   Worry about not having enough food to eat Never   I eat when I am depressed Less Than Weekly   I eat when I am stressed A Few Times a Week   I eat when I am bored A Few Times a Week   I eat when I am anxious A Few Times a Week   I eat when I am happy or as a reward Less Than Weekly   I feel hungry all the time even if I just have eaten A Few Times a Week   Feeling full is important to me Never   I finish all the food on my plate even if I am already full Once a Week   I can't resist eating delicious food or walk past the good food/smell A Few Times a Week   I eat/snack without noticing that I am eating A Few Times a Week   I eat when I am preparing the meal Less Than Weekly   I eat more than usual when I see others eating A Few Times a Week   I have trouble not eating sweets, ice cream, cookies, or chips if they are around the house Almost Everyday   I think about food all day Everyday   What foods, if any, do you crave? Chips/Crackers   Please list any other foods you crave? Cheesy things, anything that can be dipped           9/11/2023    11:20 AM   Amount of Food   I feel out of control when eating Weekly   I eat a large amount of food, like a loaf of bread, a box of cookies, a pint/quart of ice cream, all at once Weekly    I eat a large amount of food even when I am not hungry Almost Everyday   I eat rapidly Everyday   I eat alone because I feel embarrassed and do not want others to see how much I have eaten Weekly   I eat until I am uncomfortably full Weekly   I feel bad, disgusted, or guilty after I overeat Weekly       Physical Activity:        9/11/2023    11:20 AM   Activity/Exercise History   How much of a typical 12 hour day do you spend sitting? Half the Day   How much of a typical 12 hour day do you spend lying down? Less Than Half the Day   How much of a typical day do you spend walking/standing? Less Than Half the Day   How many hours (not including work) do you spend on the TV/Video Games/Computer/Tablet/Phone? 4-5 Hours   How many times a week are you active for the purpose of exercise? 2-3 Times a Week   What keeps you from being more active? Lack of Time    Too tired    Other   How many total minutes do you spend doing some activity for the purpose of exercising when you exercise? More Than 30 Minutes       Nutrition Prescription  Recommended energy/nutrient modification.      Nutrition Diagnosis  Obesity r/t long history of positive energy balance aeb BMI >30.    Nutrition Intervention  Reviewed and modified plan  Answered pt questions  Coordination of care   Nutrition education   AVS and handouts via [employer]    Expected Engagement: good    Nutrition Goals/Resources:  Continue aiming for 1 fruit per day and 2 servings of vegetables   Continue focusing on protein at meal time. Aim to eat protein first if finding it's hard to get all food groups in  Keep up the hard work with hydration!  Walking dogs as able     Protein powder examples:   - Orgain   - Isopure   - Genepro   - Premier   - Garden of life   - Beneprotein      Fast, easy meal components/ideas:    - String Cheese    - Hard boiled eggs    - Tuna    - Shrimp     - Roit chicken    - Pre-made chicken in refrigerator section (plain or flavored, example: Good &  Gather Marlyn Masala Chicken)     - Frozen chicken     - Deli meat    - Wraps or sandwiches     - Vegetables with hummus OR another dip    - Ready to go grain packs     - Protein bars    - Protein shakes     - Fruit    - Salad kits    - Chicken sausage links     - Smoothies    - Consider Barilla Protein + pasta over regular       Follow-Up:  Monday, June 24th at 8:30 am with Mary     Time spent with patient: 22 minutes.  Emilia Weiss, JFEF, RD, LD

## 2024-04-30 NOTE — PATIENT INSTRUCTIONS
Nutrition Goals/Resources:  Continue aiming for 1 fruit per day and 2 servings of vegetables   Continue focusing on protein at meal time. Aim to eat protein first if finding it's hard to get all food groups in  Keep up the hard work with hydration!  Walking dogs as able     Protein powder examples:   - Orgain   - Isopure   - Genepro   - Premier   - Garden of life   - Beneprotein      Fast, easy meal components/ideas:    - String Cheese    - Hard boiled eggs    - Tuna    - Shrimp     - Roit chicken    - Pre-made chicken in refrigerator section (plain or flavored, example: Good & Gather Marlyn Masala Chicken)     - Frozen chicken     - Deli meat    - Wraps or sandwiches     - Vegetables with hummus OR another dip    - Ready to go grain packs     - Protein bars    - Protein shakes     - Fruit    - Salad kits    - Chicken sausage links     - Smoothies    - Consider Barilla Protein + pasta over regular       Follow-Up:  Monday, June 24th at 8:30 am with Mary Gan (Duncan), MPP-D, RD, LD  Clinic #: [phone]     present

## 2024-05-10 ENCOUNTER — TELEPHONE (OUTPATIENT)
Dept: FAMILY MEDICINE | Facility: CLINIC | Age: 44
End: 2024-05-10
Payer: COMMERCIAL

## 2024-05-10 NOTE — TELEPHONE ENCOUNTER
Patient Quality Outreach    Patient is due for the following:   Hypertension -  Hypertension follow-up visit    Next Steps:   Unable to reach patient.    Type of outreach:    Phone, left message for patient/parent to call back.    Questions for provider review:    None           Bob Weldon RN

## 2024-06-04 ENCOUNTER — VIRTUAL VISIT (OUTPATIENT)
Dept: ENDOCRINOLOGY | Facility: CLINIC | Age: 44
End: 2024-06-04
Payer: COMMERCIAL

## 2024-06-04 VITALS — WEIGHT: 293 LBS | BODY MASS INDEX: 44.41 KG/M2 | HEIGHT: 68 IN

## 2024-06-04 DIAGNOSIS — E66.813 CLASS 3 SEVERE OBESITY DUE TO EXCESS CALORIES WITHOUT SERIOUS COMORBIDITY WITH BODY MASS INDEX (BMI) OF 45.0 TO 49.9 IN ADULT (H): Primary | ICD-10-CM

## 2024-06-04 DIAGNOSIS — E66.01 CLASS 3 SEVERE OBESITY DUE TO EXCESS CALORIES WITHOUT SERIOUS COMORBIDITY WITH BODY MASS INDEX (BMI) OF 45.0 TO 49.9 IN ADULT (H): Primary | ICD-10-CM

## 2024-06-04 PROCEDURE — G2211 COMPLEX E/M VISIT ADD ON: HCPCS | Mod: 95 | Performed by: INTERNAL MEDICINE

## 2024-06-04 PROCEDURE — 99214 OFFICE O/P EST MOD 30 MIN: CPT | Mod: 95 | Performed by: INTERNAL MEDICINE

## 2024-06-04 ASSESSMENT — PAIN SCALES - GENERAL: PAINLEVEL: NO PAIN (0)

## 2024-06-04 NOTE — PATIENT INSTRUCTIONS
PLAN:   - increase Wegovy to 1.7 mg weekly  - continue to work on healthy food choice  - restart exercising regularly    FOLLOW-UP:    See Lauren Bloch, PharmD in 3 month(s)  See Dr.Tasma MACDONALD in 6 month(s)    If you have any questions, please do not hesitate to call Weight management clinic at [phone] or [phone].  If you need to fax, please fax to [phone].    Sincerely,    Kamaljit Stephens MD  Endocrinology

## 2024-06-04 NOTE — PROGRESS NOTES
"    Return Medical Weight Management Note     Digna Jacome  MRN:  [id number]  :  1980  CECILE:  2024    Dear Lindsay Turner MD,    I had the pleasure of seeing your patient Digna Jacome. She is a 43 year old female who I am continuing to see for treatment of obesity related to: anxiety, prediabetes, hypertension, back pain, hip pain         2023    11:20 AM   --   I have the following health issues associated with obesity Pre-Diabetes    High Blood Pressure    Asthma   I have the following symptoms associated with obesity Depression    Back Pain    Hip Pain   Late 20s - gained a lot weight from depression and anxiety --> weight was up to 275 lbs then lost 50 lbs with fasting and keto. She subsequently gained back and more and then plateau at 350 lbs.     Eating habit: stress eating, binges eating, has food noise in her head, obsession with food -- chip, cheese, pizza, savory food.     INTERVAL HISTORY:  Initial visit 2023. F/up with Lauren Bloch, MTM pharmD 2023.   Wegovy approved but could not get due to national shortage.  Started Saxenda 2023 and changed to Wegovy 3/2024  Has been on Wegovy 1.0 mg weekly for the past few months.  Has noticed more food noise lately    Exercise: more walking outside    CURRENT WEIGHT:   321 lbs 0 oz    Initial Weight (lbs): 345.2 lbs     Cumulative weight loss (lbs): 24.2  Weight Loss Percentage: 7.01%        2024     5:16 PM   Changes and Difficulties   I have made the following changes to my diet since my last visit: More veggies and protein, less carbs   With regards to my diet, I am still struggling with: Would like to add more veggies/fruit on a daily basis   I have made the following changes to my activity/exercise since my last visit: None   With regards to my activity/exercise, I am still struggling with: Currently recovering from a pulled psoas muscle       VITALS:  Ht 1.727 m (5' 8\")   Wt 145.6 kg (321 lb)   BMI 48.81 kg/m      MEDICATIONS: "   Current Outpatient Medications   Medication Sig Dispense Refill    albuterol (PROAIR HFA/PROVENTIL HFA/VENTOLIN HFA) 108 (90 Base) MCG/ACT inhaler Inhale 2 puffs into the lungs every 6 hours as needed 18 g 3    ALPRAZolam (XANAX) 0.25 MG tablet Take 0.25 mg by mouth daily as needed for anxiety      escitalopram (LEXAPRO) 20 MG tablet Take 1 tablet (20 mg) by mouth daily 90 tablet 3    famotidine (PEPCID) 20 MG tablet Use one-half to one tablet (10-20 mg) by mouth bedtime. 30 tablet 3    levonorgestrel (MIRENA) 20 MCG/DAY IUD 1 each by Intrauterine route      Semaglutide-Weight Management (WEGOVY) 1 MG/0.5ML pen Inject 1 mg Subcutaneous once a week 6 mL 0           1/8/2024     5:16 PM   Weight Loss Medication History Reviewed With Patient   Which weight loss medications are you currently taking on a regular basis? Saxenda (liraglutide)   Are you having any side effects from the weight loss medication that we have prescribed you? Yes   If you are having side effects please describe: Gassy/nausea       ASSESSMENT:   Digna Jacome is a 43 year old female who I am continuing to see for treatment of obesity related to: anxiety, prediabetes, hypertension, back pain, hip pain     Responded well to Wegovy with tolerable side effects  Currently on Wegovy 1.0 mg daily  More food noise lately    PLAN:   - increase Wegovy to 1.7 mg weekly  - continue to work on healthy food choice  - restart exercising regularly    FOLLOW-UP:    See Lauren Bloch, PharmD in 3 month(s)  See Dr.Tasma MACDONALD in 6 month(s)    Joined the call at 6/4/2024, 11:36:30 am.  Left the call at 6/4/2024, 11:42:24 am.  You were on the call for 5 minutes 53 seconds .    Sincerely,    Kamaljit Stephens MD

## 2024-06-04 NOTE — NURSING NOTE
Is the patient currently in the state of [place]? YES    Visit mode:VIDEO    If the visit is dropped, the patient can be reconnected by: VIDEO VISIT: Text to cell phone:   Telephone Information:   Mobile [phone]       Will anyone else be joining the visit? NO  (If patient encounters technical issues they should call [phone]    How would you like to obtain your AVS? MyChart    Are changes needed to the allergy or medication list? Pt stated no changes to allergies and Pt stated no med changes    Are refills needed on medications prescribed by this physician? NO     Reason for visit: RECHECK    Wt other than 24 hrs:  June 1st  Pain more than one location:  no  Diana FALLF

## 2024-06-04 NOTE — PROGRESS NOTES
Virtual Visit Details    Type of service:  Video Visit     Originating Location (pt. Location): Home    Distant Location (provider location):  Off-site  Platform used for Video Visit: Alap

## 2024-06-04 NOTE — LETTER
2024       RE: Digna Jacome  [address]  [facility] [address]     Dear Colleague,    Thank you for referring your patient, Digna Jacome, to the [facility] [place] at [facility]. Please see a copy of my visit note below.        Return Medical Weight Management Note     Digna Jacome  MRN:  [id number]  :  1980  CECILE:  2024    Dear Lindsay Turner MD,    I had the pleasure of seeing your patient Digna Jacome. She is a 43 year old female who I am continuing to see for treatment of obesity related to: anxiety, prediabetes, hypertension, back pain, hip pain         2023    11:20 AM   --   I have the following health issues associated with obesity Pre-Diabetes    High Blood Pressure    Asthma   I have the following symptoms associated with obesity Depression    Back Pain    Hip Pain   Late 20s - gained a lot weight from depression and anxiety --> weight was up to 275 lbs then lost 50 lbs with fasting and keto. She subsequently gained back and more and then plateau at 350 lbs.     Eating habit: stress eating, binges eating, has food noise in her head, obsession with food -- chip, cheese, pizza, savory food.     INTERVAL HISTORY:  Initial visit 2023. F/up with Lauren Bloch, MTM pharmD 2023.   Wegovy approved but could not get due to national shortage.  Started Saxenda 2023 and changed to Wegovy 3/2024  Has been on Wegovy 1.0 mg weekly for the past few months.  Has noticed more food noise lately    Exercise: more walking outside    CURRENT WEIGHT:   321 lbs 0 oz    Initial Weight (lbs): 345.2 lbs     Cumulative weight loss (lbs): 24.2  Weight Loss Percentage: 7.01%        2024     5:16 PM   Changes and Difficulties   I have made the following changes to my diet since my last visit: More veggies and protein, less carbs   With regards to my diet, I am still struggling with: Would like to add more veggies/fruit on  "a daily basis   I have made the following changes to my activity/exercise since my last visit: None   With regards to my activity/exercise, I am still struggling with: Currently recovering from a pulled psoas muscle       VITALS:  Ht 1.727 m (5' 8\")   Wt 145.6 kg (321 lb)   BMI 48.81 kg/m      MEDICATIONS:   Current Outpatient Medications   Medication Sig Dispense Refill     albuterol (PROAIR HFA/PROVENTIL HFA/VENTOLIN HFA) 108 (90 Base) MCG/ACT inhaler Inhale 2 puffs into the lungs every 6 hours as needed 18 g 3     ALPRAZolam (XANAX) 0.25 MG tablet Take 0.25 mg by mouth daily as needed for anxiety       escitalopram (LEXAPRO) 20 MG tablet Take 1 tablet (20 mg) by mouth daily 90 tablet 3     famotidine (PEPCID) 20 MG tablet Use one-half to one tablet (10-20 mg) by mouth bedtime. 30 tablet 3     levonorgestrel (MIRENA) 20 MCG/DAY IUD 1 each by Intrauterine route       Semaglutide-Weight Management (WEGOVY) 1 MG/0.5ML pen Inject 1 mg Subcutaneous once a week 6 mL 0           1/8/2024     5:16 PM   Weight Loss Medication History Reviewed With Patient   Which weight loss medications are you currently taking on a regular basis? Saxenda (liraglutide)   Are you having any side effects from the weight loss medication that we have prescribed you? Yes   If you are having side effects please describe: Gassy/nausea       ASSESSMENT:   Digna Jacome is a 43 year old female who I am continuing to see for treatment of obesity related to: anxiety, prediabetes, hypertension, back pain, hip pain     Responded well to Wegovy with tolerable side effects  Currently on Wegovy 1.0 mg daily  More food noise lately    PLAN:   - increase Wegovy to 1.7 mg weekly  - continue to work on healthy food choice  - restart exercising regularly    FOLLOW-UP:    See Lauren Bloch, PharmD in 3 month(s)  See Dr.Tasma MACDONALD in 6 month(s)    Joined the call at 6/4/2024, 11:36:30 am.  Left the call at 6/4/2024, 11:42:24 am.  You were on the call for 5 minutes " 53 seconds .    Sincerely,    Kamaljit Stephens MD    Virtual Visit Details    Type of service:  Video Visit     Originating Location (pt. Location): Home    Distant Location (provider location):  Off-site  Platform used for Video Visit: SheronWell

## 2024-06-05 ENCOUNTER — TELEPHONE (OUTPATIENT)
Dept: ENDOCRINOLOGY | Facility: CLINIC | Age: 44
End: 2024-06-05
Payer: COMMERCIAL

## 2024-06-05 NOTE — TELEPHONE ENCOUNTER
Left Voicemail (1st Attempt) and Sent Mychart (1st Attempt) for the patient to call back and schedule the following:    Appointment type: KRISS FIGUEREDO   Provider: Lauren Bloch,RPH   Return date: 3 mos   Specialty phone number: [phone]  Additional appointment(s) needed: yes   Additonal Notes: KRISS ACHARYA, , 6 mo follow-up

## 2024-06-21 NOTE — PROGRESS NOTES
"Video-Visit Details    Type of service:  Video Visit    Video Start Time:  8:34 am  Video End Time:  9:45 am    Originating Location (pt. Location): Home    Distant Location (provider location):  Offsite (providers home)     Platform used for Video Visit: ShutterCal      Weight Management Nutrition Consultation    Digna Jacome is a 44 year old female presents today for weight management nutrition consultation.  Patient referred by Dr. Mari on 2023.    Patient with Co-morbidities of obesity includin/11/2023    11:20 AM   --   I have the following health issues associated with obesity Pre-Diabetes    High Blood Pressure    Asthma   I have the following symptoms associated with obesity Depression    Back Pain    Hip Pain         Anthropometrics:  Weight 23: 354 lbs with BMI 53.04    Estimated body mass index is 47.75 kg/m  as calculated from the following:    Height as of this encounter: 1.727 m (5' 7.99\").    Weight as of this encounter: 142.4 kg (314 lb).    Current weight: 314 lbs, pt report   - Down 9 lbs since last RD visit     Medications for Weight Loss:  Wegovy - dose increased to 1.7 mg, 3rd week of taking this. No side effects.    Hx of taking Saxenda      NUTRITION HISTORY  Food allergies: NKFA  Does eat meat/fish/seafood - chicken mostly. Prefers fish cooked by others (feels she struggles to make it taste less fishy)  Food intolerances: doesn't feel good eating higher sugar/higher carb  Endorses being a picky eater  Previous methods of diet modification for weight loss: keto  RD before: No, WW, nutrisource    Pt goals: make better food choices    Meal preps but feels sick of choices come Th/Friday and prefers to order take out.  Tends to be an all or nothing person per pt.    Typically eats 2-3 meals/day.   Feels she does well during the day with listening to hunger/satiety but harder in evenings (stress, anxiety, not as busy)    PA: pickle ball, hiking, walking dog    Please see " previous RD notes for more information.    Dec 2023:  Things going well overall. Meeting goals most days aside from occ when traveling, etc.    Typically doing 2-3 small meals/day and 2 snacks.    Started Saxenda 2-3 weeks ago - not as hungry, smaller portions. Noticing she is eating more carbohydrates on the weekends in particular.      Snacks: string cheese, apple, protein shakes    PA: more active with nicer weather    Jan 2024:  Continues to notice benefit from Saxenda - less food noise and hunger. Added in more protein/vegetables and less carbohydrates.     Hydration: 100+ oz/day water, 1 coke zero/day, almond milk in protein shake, alcohol a couple times/week   - Finds if getting 120 oz is hard to drink enough the next day    PA: limited due to recent psoas muscle injury (has been going to chiro and doing salt bathes/massages/stretches at home)    April 2024:  Pt no showed visit 2/15 and visit 3/14 had to be rescheduled due to this provider being out sick.     Doing well overall. Saw Abril 4/22 for med check. Was having lots of GI issues. Doing much better now  - GI issues at baseline. Plan to add in cirtucel daily to help with GI concerns - has not started yet but plans to this week.    Eating 2-3 meals/day. Protein focused.   Getting 2 vegetables and 1 fruit daily.   Lots of sandwiches lately - grilled turkey/cheese.  Doing well listening to hunger/satiety.     Busy at work - working 60+ hr weeks currently. Lots of grab and go. Meal might be more snack based such as string cheese OR balanced break with apple/orange OR granola bar.     Hydration - no concerns, lots of water, 1 coke zero/day. Occ cocktails. [place] milk in protein shake    PA: walking dogs, gym occ (had two weeks she went 4x/week but harder with busy season at work)     June 2024:  Has been busy with work but now work has been slowing down, more recently trying to get back into a schedule. Hasn't noticed many changes yet with med dose  increase, but eating out a lot lately due to her birthday. Made sure to get a salad and eat that first, entree heavy on protein, and taking home leftovers.    Hydration: Has been going well, still drinking lots of water.     PA: Has been up/down. Started going back to the gym, doing a lot more walking. Playing pickleball a couple nights per week. Wanting to do things outside. Walking dogs more when weather is nice. On weekends or slow days at work trying to do 3-4 miles.    Previous goals:  Continue aiming for 1 fruit per day and 2 servings of vegetables - In progress, trying to get back in routine   Continue focusing on protein at meal time. Aim to eat protein first if finding it's hard to get all food groups in- Met, going really well per pt.   Keep up the hard work with hydration! - Met  Walking dogs as able - Met    Additional information:  FT - , Child Protection          9/11/2023    11:20 AM   Diet Recall Review with Patient   If you do eat breakfast, what types of food do you eat? Protein shake or toast or fruit   If you do eat lunch, what types of food do you typically eat? [employer] and fruit or popcorn or leftovers   If you do eat supper, what types of food do you typically eat? Leftovers or chicken/rice or delivery or popcorn   If you do snack, what types of food do you typically eat? Popcorn   How many glasses of juice do you drink in a typical day? 0   How many of glasses of milk do you drink in a typical day? 0   How many 8oz glasses of sugar containing drinks such as Saroj-Aid/sweet tea do you drink in a day? 0   How many cans/bottles of sugar pop/soda/tea/sports drinks do you drink in a day? 0   How many cans/bottles of diet pop/soda/tea or sports drink do you drink in a day? 1   How often do you have a drink of alcohol? 2-4 Times a Month   If you do drink, how many drinks might you have in a day? 1 or 2           9/11/2023    11:20 AM   Eating Habits   Generally, my meals include foods  like these bread, pasta, rice, potatoes, corn, crackers, sweet dessert, pop, or juice A Few Times a Week   Generally, my meals include foods like these fried meats, brats, burgers, french fries, pizza, cheese, chips, or ice cream Once a Week   Eat fast food (like McDonalds, Burger Ryan, Taco Bell) Once a Week   Eat at a buffet or sit-down restaurant Once a Week   Eat most of my meals in front of the TV or computer Almost Everyday   Often skip meals, eat at random times, have no regular eating times A Few Times a Week   Rarely sit down for a meal but snack or graze throughout Less Than Weekly   Eat extra snacks between meals Less Than Weekly   Eat most of my food at the end of the day Almost Everyday   Eat in the middle of the night or wake up at night to eat Never   Eat extra snacks to prevent or correct low blood sugar Never   Eat to prevent acid reflux or stomach pain Never   Worry about not having enough food to eat Never   I eat when I am depressed Less Than Weekly   I eat when I am stressed A Few Times a Week   I eat when I am bored A Few Times a Week   I eat when I am anxious A Few Times a Week   I eat when I am happy or as a reward Less Than Weekly   I feel hungry all the time even if I just have eaten A Few Times a Week   Feeling full is important to me Never   I finish all the food on my plate even if I am already full Once a Week   I can't resist eating delicious food or walk past the good food/smell A Few Times a Week   I eat/snack without noticing that I am eating A Few Times a Week   I eat when I am preparing the meal Less Than Weekly   I eat more than usual when I see others eating A Few Times a Week   I have trouble not eating sweets, ice cream, cookies, or chips if they are around the house Almost Everyday   I think about food all day Everyday   What foods, if any, do you crave? Chips/Crackers   Please list any other foods you crave? Cheesy things, anything that can be dipped           9/11/2023     11:20 AM   Amount of Food   I feel out of control when eating Weekly   I eat a large amount of food, like a loaf of bread, a box of cookies, a pint/quart of ice cream, all at once Weekly   I eat a large amount of food even when I am not hungry Almost Everyday   I eat rapidly Everyday   I eat alone because I feel embarrassed and do not want others to see how much I have eaten Weekly   I eat until I am uncomfortably full Weekly   I feel bad, disgusted, or guilty after I overeat Weekly       Physical Activity:        9/11/2023    11:20 AM   Activity/Exercise History   How much of a typical 12 hour day do you spend sitting? Half the Day   How much of a typical 12 hour day do you spend lying down? Less Than Half the Day   How much of a typical day do you spend walking/standing? Less Than Half the Day   How many hours (not including work) do you spend on the TV/Video Games/Computer/Tablet/Phone? 4-5 Hours   How many times a week are you active for the purpose of exercise? 2-3 Times a Week   What keeps you from being more active? Lack of Time    Too tired    Other   How many total minutes do you spend doing some activity for the purpose of exercising when you exercise? More Than 30 Minutes       Nutrition Prescription  Recommended energy/nutrient modification.      Nutrition Diagnosis  Obesity r/t long history of positive energy balance aeb BMI >30.    Nutrition Intervention  Reviewed and modified plan  Answered pt questions  Coordination of care   Nutrition education   AVS and handouts via [employer]    Expected Engagement: good    Nutrition Goals/Resources:  1. Continue aiming for 1 fruit per day and 2 servings of vegetables   2. Continue focusing on protein at meal time. Aim to eat protein first if finding it's hard to get all food groups in  3. Keep up the hard work with hydration!  4. Aim to work out 4-5 times /week (I.e. playing pickleball, walking dogs at least 1 mile)    Protein powder examples:   - Orgain   -  Isopure   - Genepro   - Premier   - Garden of life   - Beneprotein      Fast, easy meal components/ideas:    - String Cheese    - Hard boiled eggs    - Tuna    - Shrimp     - Roit chicken    - Pre-made chicken in refrigerator section (plain or flavored, example: [employer] Gather Marlyn Masala Chicken)     - Frozen chicken     - Deli meat    - Wraps or sandwiches     - Vegetables with hummus OR another dip    - Ready to go grain packs     - Protein bars    - Protein shakes     - Fruit    - Salad kits    - Chicken sausage links     - Smoothies    - Consider Barilla Protein + pasta over regular       Follow-Up:  Wednesday, July 24th at 8:30 am    Time spent with patient: 11 minutes.  Mary Das MPS, RD, LD  Clinic #: [phone]

## 2024-06-24 ENCOUNTER — VIRTUAL VISIT (OUTPATIENT)
Dept: ENDOCRINOLOGY | Facility: CLINIC | Age: 44
End: 2024-06-24
Payer: COMMERCIAL

## 2024-06-24 VITALS — HEIGHT: 68 IN | BODY MASS INDEX: 44.41 KG/M2 | WEIGHT: 293 LBS

## 2024-06-24 DIAGNOSIS — E66.813 CLASS 3 SEVERE OBESITY DUE TO EXCESS CALORIES WITHOUT SERIOUS COMORBIDITY WITH BODY MASS INDEX (BMI) OF 45.0 TO 49.9 IN ADULT (H): ICD-10-CM

## 2024-06-24 DIAGNOSIS — Z71.3 NUTRITIONAL COUNSELING: Primary | ICD-10-CM

## 2024-06-24 DIAGNOSIS — E66.01 CLASS 3 SEVERE OBESITY DUE TO EXCESS CALORIES WITHOUT SERIOUS COMORBIDITY WITH BODY MASS INDEX (BMI) OF 45.0 TO 49.9 IN ADULT (H): ICD-10-CM

## 2024-06-24 PROCEDURE — 97803 MED NUTRITION INDIV SUBSEQ: CPT | Mod: 95

## 2024-06-24 PROCEDURE — 99207 PR NO CHARGE LOS: CPT | Mod: 95

## 2024-06-24 ASSESSMENT — PAIN SCALES - GENERAL: PAINLEVEL: NO PAIN (1)

## 2024-06-24 NOTE — PATIENT INSTRUCTIONS
Reyes Sawyer,    Please follow-up with dietitian on Wednesday, July 24th at 8:30 am.    Thank you,    Mary Das, MPS, RD, LD  If you need to schedule or reschedule, please call [phone].    Nutrition Goals:  1. Continue aiming for 1 fruit per day and 2 servings of vegetables   2. Continue focusing on protein at meal time. Aim to eat protein first if finding it's hard to get all food groups in  3. Keep up the hard work with hydration!  4. Aim to work out 4-5 times /week (playing pickleball, walking dogs at least 1 mile)    Protein powder examples:   - Orgain   - Isopure   - Genepro   - Premier   - Garden of life   - Beneprotein      Fast, easy meal components/ideas:    - String Cheese    - Hard boiled eggs    - Tuna    - Shrimp     - Roit chicken    - Pre-made chicken in refrigerator section (plain or flavored, example: [employer] G[employer] Marlyn Masala Chicken)     - Frozen chicken     - Deli meat    - Wraps or sandwiches     - Vegetables with hummus OR another dip    - Ready to go grain packs     - Protein bars    - Protein shakes     - Fruit    - Salad kits    - Chicken sausage links     - Smoothies    - Consider Barilla Protein + pasta over regular

## 2024-06-24 NOTE — LETTER
"2024       RE: Digna Jacome  [address]   [facility] [address]     Dear Colleague,    Thank you for referring your patient, Digna Jacome, to the [facility]. Please see a copy of my visit note below.    Video-Visit Details    Type of service:  Video Visit    Video Start Time:  8:34 am  Video End Time:  9:45 am    Originating Location (pt. Location): Home    Distant Location (provider location):  Offsite (providers home)     Platform used for Video Visit: [employer]      Weight Management Nutrition Consultation    Digna Jacome is a 44 year old female presents today for weight management nutrition consultation.  Patient referred by Dr. Mari on 2023.    Patient with Co-morbidities of obesity includin/11/2023    11:20 AM   --   I have the following health issues associated with obesity Pre-Diabetes    High Blood Pressure    Asthma   I have the following symptoms associated with obesity Depression    Back Pain    Hip Pain         Anthropometrics:  Weight 23: 354 lbs with BMI 53.04    Estimated body mass index is 47.75 kg/m  as calculated from the following:    Height as of this encounter: 1.727 m (5' 7.99\").    Weight as of this encounter: 142.4 kg (314 lb).    Current weight: 314 lbs, pt report   - Down 9 lbs since last RD visit     Medications for Weight Loss:  Wegovy - dose increased to 1.7 mg, 3rd week of taking this. No side effects.    Hx of taking Saxenda      NUTRITION HISTORY  Food allergies: NKFA  Does eat meat/fish/seafood - chicken mostly. Prefers fish cooked by others (feels she struggles to make it taste less fishy)  Food intolerances: doesn't feel good eating higher sugar/higher carb  Endorses being a picky eater  Previous methods of diet modification for weight loss: keto  RD before: No, WW, nutrisource    Pt goals: make better food choices    Meal preps but feels sick " of choices come Thur/Friday and prefers to order take out.  Tends to be an all or nothing person per pt.    Typically eats 2-3 meals/day.   Feels she does well during the day with listening to hunger/satiety but harder in evenings (stress, anxiety, not as busy)    PA: pickle ball, hiking, walking dog    Please see previous RD notes for more information.    Dec 2023:  Things going well overall. Meeting goals most days aside from occ when traveling, etc.    Typically doing 2-3 small meals/day and 2 snacks.    Started Saxenda 2-3 weeks ago - not as hungry, smaller portions. Noticing she is eating more carbohydrates on the weekends in particular.      Snacks: string cheese, apple, protein shakes    PA: more active with nicer weather    Jan 2024:  Continues to notice benefit from Saxenda - less food noise and hunger. Added in more protein/vegetables and less carbohydrates.     Hydration: 100+ oz/day water, 1 coke zero/day, almond milk in protein shake, alcohol a couple times/week   - Finds if getting 120 oz is hard to drink enough the next day    PA: limited due to recent psoas muscle injury (has been going to chiro and doing salt bathes/massages/stretches at home)    April 2024:  Pt no showed visit 2/15 and visit 3/14 had to be rescheduled due to this provider being out sick.     Doing well overall. Saw Abril 4/22 for med check. Was having lots of GI issues. Doing much better now  - GI issues at baseline. Plan to add in cirtucel daily to help with GI concerns - has not started yet but plans to this week.    Eating 2-3 meals/day. Protein focused.   Getting 2 vegetables and 1 fruit daily.   Lots of sandwiches lately - grilled turkey/cheese.  Doing well listening to hunger/satiety.     Busy at work - working 60+ hr weeks currently. Lots of grab and go. Meal might be more snack based such as string cheese OR balanced break with apple/orange OR granola bar.     Hydration - no concerns, lots of water, 1 coke zero/day. Occ  cocktails. Brinklow milk in protein shake    PA: walking dogs, gym occ (had two weeks she went 4x/week but harder with busy season at work)     June 2024:  Has been busy with work but now work has been slowing down, more recently trying to get back into a schedule. Hasn't noticed many changes yet with med dose increase, but eating out a lot lately due to her birthday. Made sure to get a salad and eat that first, entree heavy on protein, and taking home leftovers.    Hydration: Has been going well, still drinking lots of water.     PA: Has been up/down. Started going back to the gym, doing a lot more walking. Playing pickleball a couple nights per week. Wanting to do things outside. Walking dogs more when weather is nice. On weekends or slow days at work trying to do 3-4 miles.    Previous goals:  Continue aiming for 1 fruit per day and 2 servings of vegetables - In progress, trying to get back in routine   Continue focusing on protein at meal time. Aim to eat protein first if finding it's hard to get all food groups in- Met, going really well per pt.   Keep up the hard work with hydration! - Met  Walking dogs as able - Met    Additional information:  FT - , Child Protection          9/11/2023    11:20 AM   Diet Recall Review with Patient   If you do eat breakfast, what types of food do you eat? Protein shake or toast or fruit   If you do eat lunch, what types of food do you typically eat? [place] and fruit or popcorn or leftovers   If you do eat supper, what types of food do you typically eat? Leftovers or chicken/rice or delivery or popcorn   If you do snack, what types of food do you typically eat? Popcorn   How many glasses of juice do you drink in a typical day? 0   How many of glasses of milk do you drink in a typical day? 0   How many 8oz glasses of sugar containing drinks such as Saroj-Aid/sweet tea do you drink in a day? 0   How many cans/bottles of sugar pop/soda/tea/sports drinks do you drink in  a day? 0   How many cans/bottles of diet pop/soda/tea or sports drink do you drink in a day? 1   How often do you have a drink of alcohol? 2-4 Times a Month   If you do drink, how many drinks might you have in a day? 1 or 2           9/11/2023    11:20 AM   Eating Habits   Generally, my meals include foods like these bread, pasta, rice, potatoes, corn, crackers, sweet dessert, pop, or juice A Few Times a Week   Generally, my meals include foods like these fried meats, brats, burgers, french fries, pizza, cheese, chips, or ice cream Once a Week   Eat fast food (like McDonalds, [employer], Taco Bell) Once a Week   Eat at a buffet or sit-down restaurant Once a Week   Eat most of my meals in front of the TV or computer Almost Everyday   Often skip meals, eat at random times, have no regular eating times A Few Times a Week   Rarely sit down for a meal but snack or graze throughout Less Than Weekly   Eat extra snacks between meals Less Than Weekly   Eat most of my food at the end of the day Almost Everyday   Eat in the middle of the night or wake up at night to eat Never   Eat extra snacks to prevent or correct low blood sugar Never   Eat to prevent acid reflux or stomach pain Never   Worry about not having enough food to eat Never   I eat when I am depressed Less Than Weekly   I eat when I am stressed A Few Times a Week   I eat when I am bored A Few Times a Week   I eat when I am anxious A Few Times a Week   I eat when I am happy or as a reward Less Than Weekly   I feel hungry all the time even if I just have eaten A Few Times a Week   Feeling full is important to me Never   I finish all the food on my plate even if I am already full Once a Week   I can't resist eating delicious food or walk past the good food/smell A Few Times a Week   I eat/snack without noticing that I am eating A Few Times a Week   I eat when I am preparing the meal Less Than Weekly   I eat more than usual when I see others eating A Few Times a Week    I have trouble not eating sweets, ice cream, cookies, or chips if they are around the house Almost Everyday   I think about food all day Everyday   What foods, if any, do you crave? Chips/Crackers   Please list any other foods you crave? Cheesy things, anything that can be dipped           9/11/2023    11:20 AM   Amount of Food   I feel out of control when eating Weekly   I eat a large amount of food, like a loaf of bread, a box of cookies, a pint/quart of ice cream, all at once Weekly   I eat a large amount of food even when I am not hungry Almost Everyday   I eat rapidly Everyday   I eat alone because I feel embarrassed and do not want others to see how much I have eaten Weekly   I eat until I am uncomfortably full Weekly   I feel bad, disgusted, or guilty after I overeat Weekly       Physical Activity:        9/11/2023    11:20 AM   Activity/Exercise History   How much of a typical 12 hour day do you spend sitting? Half the Day   How much of a typical 12 hour day do you spend lying down? Less Than Half the Day   How much of a typical day do you spend walking/standing? Less Than Half the Day   How many hours (not including work) do you spend on the TV/Video Games/Computer/Tablet/Phone? 4-5 Hours   How many times a week are you active for the purpose of exercise? 2-3 Times a Week   What keeps you from being more active? Lack of Time    Too tired    Other   How many total minutes do you spend doing some activity for the purpose of exercising when you exercise? More Than 30 Minutes       Nutrition Prescription  Recommended energy/nutrient modification.      Nutrition Diagnosis  Obesity r/t long history of positive energy balance aeb BMI >30.    Nutrition Intervention  Reviewed and modified plan  Answered pt questions  Coordination of care   Nutrition education   AVS and handouts via [employer]    Expected Engagement: good    Nutrition Goals/Resources:  1. Continue aiming for 1 fruit per day and 2 servings of  vegetables   2. Continue focusing on protein at meal time. Aim to eat protein first if finding it's hard to get all food groups in  3. Keep up the hard work with hydration!  4. Aim to work out 4-5 times /week (I.e. playing pickleball, walking dogs at least 1 mile)    Protein powder examples:   - Orgain   - Isopure   - Genepro   - Premier   - Garden of life   - Beneprotein      Fast, easy meal components/ideas:    - String Cheese    - Hard boiled eggs    - Tuna    - Shrimp     - Roit chicken    - Pre-made chicken in refrigerator section (plain or flavored, example: [employer] Marlyn Masala Chicken)     - Frozen chicken     - Deli meat    - Wraps or sandwiches     - Vegetables with hummus OR another dip    - Ready to go grain packs     - Protein bars    - Protein shakes     - Fruit    - Salad kits    - Chicken sausage links     - Smoothies    - Consider Barilla Protein + pasta over regular       Follow-Up:  Wednesday, July 24th at 8:30 am    Time spent with patient: 11 minutes.  GABBY Lin, RD, LD  Clinic #: [phone]

## 2024-06-24 NOTE — NURSING NOTE
Is the patient currently in the state of [place]? YES    Visit mode:VIDEO    If the visit is dropped, the patient can be reconnected by: VIDEO VISIT: Text to cell phone:   Telephone Information:   Mobile [phone]       Will anyone else be joining the visit? NO  (If patient encounters technical issues they should call [phone]    How would you like to obtain your AVS? MyChart    Are changes needed to the allergy or medication list? Pt stated no changes to allergies and Pt stated no med changes    Are refills needed on medications prescribed by this physician? NO    Reason for visit: Follow Up    Margaret SPRINGER

## 2024-06-25 DIAGNOSIS — K21.9 GERD WITHOUT ESOPHAGITIS: ICD-10-CM

## 2024-06-28 RX ORDER — FAMOTIDINE 20 MG/1
TABLET, FILM COATED ORAL
Qty: 30 TABLET | Refills: 3 | Status: SHIPPED | OUTPATIENT
Start: 2024-06-28

## 2024-07-20 ENCOUNTER — HEALTH MAINTENANCE LETTER (OUTPATIENT)
Age: 44
End: 2024-07-20

## 2024-07-24 ENCOUNTER — TELEPHONE (OUTPATIENT)
Dept: ENDOCRINOLOGY | Facility: CLINIC | Age: 44
End: 2024-07-24

## 2024-07-24 NOTE — TELEPHONE ENCOUNTER
Patient needs to be rescheduled for their virtual visit due to Reason for Reschedule: No-Show    Scheduling team, please refer to service line late cancellation/no-show policies and reach out to patient at a later date for rescheduling.    Appointment mode: Video  Provider: Mary Das RD    LVM x3 for check in, unable to reach patient

## 2024-08-08 ENCOUNTER — VIRTUAL VISIT (OUTPATIENT)
Dept: CARDIOLOGY | Facility: CLINIC | Age: 44
End: 2024-08-08
Attending: INTERNAL MEDICINE
Payer: COMMERCIAL

## 2024-08-08 VITALS — WEIGHT: 293 LBS | BODY MASS INDEX: 47.45 KG/M2

## 2024-08-08 DIAGNOSIS — K21.9 GERD WITHOUT ESOPHAGITIS: ICD-10-CM

## 2024-08-08 DIAGNOSIS — E66.813 CLASS 3 SEVERE OBESITY DUE TO EXCESS CALORIES WITHOUT SERIOUS COMORBIDITY WITH BODY MASS INDEX (BMI) OF 45.0 TO 49.9 IN ADULT (H): ICD-10-CM

## 2024-08-08 DIAGNOSIS — E66.01 CLASS 3 SEVERE OBESITY DUE TO EXCESS CALORIES IN ADULT, UNSPECIFIED BMI, UNSPECIFIED WHETHER SERIOUS COMORBIDITY PRESENT (H): Primary | ICD-10-CM

## 2024-08-08 DIAGNOSIS — E66.813 CLASS 3 SEVERE OBESITY DUE TO EXCESS CALORIES IN ADULT, UNSPECIFIED BMI, UNSPECIFIED WHETHER SERIOUS COMORBIDITY PRESENT (H): Primary | ICD-10-CM

## 2024-08-08 DIAGNOSIS — E66.01 CLASS 3 SEVERE OBESITY DUE TO EXCESS CALORIES WITHOUT SERIOUS COMORBIDITY WITH BODY MASS INDEX (BMI) OF 45.0 TO 49.9 IN ADULT (H): ICD-10-CM

## 2024-08-08 ASSESSMENT — PAIN SCALES - GENERAL: PAINLEVEL: NO PAIN (0)

## 2024-08-08 NOTE — NURSING NOTE
Current patient location: [address]  [facility] [address]    Is the patient currently in the state of [place]? YES    Visit mode:VIDEO    If the visit is dropped, the patient can be reconnected by: VIDEO VISIT: Text to cell phone:   Telephone Information:   Mobile [phone]       Will anyone else be joining the visit? NO  (If patient encounters technical issues they should call [phone])    How would you like to obtain your AVS? MyChart    Are changes needed to the allergy or medication list? No, Pt stated no changes to allergies, and Pt stated no med changes    Are refills needed on medications prescribed by this physician? NO    Rooming Documentation:  Not applicable      Reason for visit: Medication Therapy Management    Delia SPRINGER

## 2024-08-08 NOTE — PROGRESS NOTES
Medication Therapy Management (MTM) Encounter    ASSESSMENT:                            Medication Adherence/Access: No issues identified    Weight Management   To continue at current dose Wegovy per patient preference.     GERD:   Stable.     PLAN:                            Continue current regimen for now.   Will send refills of Wegovy at 1.7 mg weekly over to [facility].   Reach out via [employer] if you are wishing to increase further between appointments.   Follow up with Dr. Kamaljit Stephens in November as planned.     Follow-up: Return in 5 months (on 12/30/2024) for Medication Therapy Management Pharmacist Visit, (scheduled today).    SUBJECTIVE/OBJECTIVE:                          Digna Jacome is a 44 year old female seen for a follow-up visit.       Reason for visit: Wegovy check in.    Allergies/ADRs: Reviewed in chart  Past Medical History: Reviewed in chart  Tobacco: She reports that she quit smoking about 13 years ago. Her smoking use included cigarettes. She started smoking about 24 years ago. She has never used smokeless tobacco.  Alcohol: not currently using    Medication Adherence/Access: no issues reported    Weight Management   Wegovy 1.7 mg once weekly     Patient reports the following medication side effects: none. She reports that overall things are going well. Issues of stomach acid/reflex have not been an issue. She has been working on being more conscious with what she is eating which has helped. She does notice that if she has greasy foods that she will have issues of diarrhea. She never needed to start citrucel due to dietary changes.     Nutrition/Eating Habits: has been doing 2-3 meals per day. Describes protein with meals is good. Working with dietitian. Gets in 2 vegetables + 1 fruit daily. Behavioral modification techniques.   Exercise/Activity:  pickle ball with friends 1 day per week, back at gym 1-2 days per week. Now walking dog more and pushing this  "- 3 miles/day.     Medication History:  Saxenda: lack of efficacy    Initial Consult Weight: 345.2 lb        Current weight today: 312 lbs 0 oz  Cumulative Weight Loss: -33.2 lb, -9.6% from baseline    Wt Readings from Last 4 Encounters:   08/08/24 141.5 kg (312 lb)   06/24/24 142.4 kg (314 lb)   06/04/24 145.6 kg (321 lb)   04/30/24 146.5 kg (323 lb)     Estimated body mass index is 47.45 kg/m  as calculated from the following:    Height as of 6/24/24: 1.727 m (5' 7.99\").    Weight as of this encounter: 141.5 kg (312 lb).    GERD:   Famotidine 20 mg once daily     Patient reports no current symptoms.   Patient feels that current regimen is effective.  The patient does not have a history of GI bleed.    Today's Vitals: Wt 141.5 kg (312 lb)   BMI 47.45 kg/m    ----------------      I spent 15 minutes with this patient today. All changes were made via collaborative practice agreement with Dr. Kamaljit Stephens. A copy of the visit note was provided to the patient's provider(s).    A summary of these recommendations was sent via [employer].    Lauren Bloch, PharmD, BCACP   Medication Therapy Management Pharmacist   [facility]    Telemedicine Visit Details  Type of service:  Video Conference via [employer]  Start Time:  8:35 AM  End Time:  8:50 AM     Medication Therapy Recommendations  No medication therapy recommendations to display   "

## 2024-08-08 NOTE — LETTER
8/8/2024      RE: Digna Jacome  [address]  [facility] [address]       Dear Colleague,    Thank you for the opportunity to participate in the care of your patient, Digna Jacome, at the [facility]. Please see a copy of my visit note below.    Medication Therapy Management (MTM) Encounter    ASSESSMENT:                            Medication Adherence/Access: No issues identified    Weight Management   To continue at current dose Wegovy per patient preference.     GERD:   Stable.     PLAN:                            Continue current regimen for now.   Will send refills of Wegovy at 1.7 mg weekly over to [facility].   Reach out via [employer] if you are wishing to increase further between appointments.   Follow up with Dr. Kamaljit Stephens in November as planned.     Follow-up: Return in 5 months (on 12/30/2024) for Medication Therapy Management Pharmacist Visit, (scheduled today).    SUBJECTIVE/OBJECTIVE:                          Digna Jacome is a 44 year old female seen for a follow-up visit.       Reason for visit: Wegovy check in.    Allergies/ADRs: Reviewed in chart  Past Medical History: Reviewed in chart  Tobacco: She reports that she quit smoking about 13 years ago. Her smoking use included cigarettes. She started smoking about 24 years ago. She has never used smokeless tobacco.  Alcohol: not currently using    Medication Adherence/Access: no issues reported    Weight Management  Wegovy 1.7 mg once weekly     Patient reports the following medication side effects: none. She reports that overall things are going well. Issues of stomach acid/reflex have not been an issue. She has been working on being more conscious with what she is eating which has helped. She does notice that if she has greasy foods that she will have issues of diarrhea. She never needed to start citrucel due to dietary  "changes.     Nutrition/Eating Habits: has been doing 2-3 meals per day. Describes protein with meals is good. Working with dietitian. Gets in 2 vegetables + 1 fruit daily. Behavioral modification techniques.   Exercise/Activity:  pickle ball with friends 1 day per week, back at gym 1-2 days per week. Now walking dog more and pushing this - 3 miles/day.     Medication History:  Saxenda: lack of efficacy    Initial Consult Weight: 345.2 lb        Current weight today: 312 lbs 0 oz  Cumulative Weight Loss: -33.2 lb, -9.6% from baseline    Wt Readings from Last 4 Encounters:   08/08/24 141.5 kg (312 lb)   06/24/24 142.4 kg (314 lb)   06/04/24 145.6 kg (321 lb)   04/30/24 146.5 kg (323 lb)     Estimated body mass index is 47.45 kg/m  as calculated from the following:    Height as of 6/24/24: 1.727 m (5' 7.99\").    Weight as of this encounter: 141.5 kg (312 lb).    GERD:   Famotidine 20 mg once daily     Patient reports no current symptoms.   Patient feels that current regimen is effective.  The patient does not have a history of GI bleed.    Today's Vitals: Wt 141.5 kg (312 lb)   BMI 47.45 kg/m    ----------------      I spent 15 minutes with this patient today. All changes were made via collaborative practice agreement with Dr. Kamaljit Stephens. A copy of the visit note was provided to the patient's provider(s).    A summary of these recommendations was sent via [employer].    Lauren Bloch, PharmD, BCACP   Medication Therapy Management Pharmacist   [facility]    Telemedicine Visit Details  Type of service:  Video Conference via [employer]  Start Time:  8:35 AM  End Time:  8:50 AM     Medication Therapy Recommendations  No medication therapy recommendations to display       Please do not hesitate to contact me if you have any questions/concerns.     Sincerely,     Lauren T. Bloch, [facility]  "

## 2024-08-08 NOTE — PATIENT INSTRUCTIONS
"Recommendations from today's MTM visit:                                                         Continue current regimen for now.   Will send refills of Wegovy at 1.7 mg weekly over to [facility].   Reach out via [employer] if you are wishing to increase further between appointments.   Follow up with Dr. Kamaljit Stephens in November as planned.     Follow-up: Return in 5 months (on 12/30/2024) for Medication Therapy Management Pharmacist Visit, (scheduled today).    It was great speaking with you today.  I value your experience and would be very thankful for your time in providing feedback in our clinic survey. In the next few days, you may receive an email or text message from [facility] [employer] with a link to a survey related to your  clinical pharmacist.\"     To schedule another MTM appointment, please call the clinic directly or you may call the MTM scheduling line at [phone] or toll-free at [phone].     My Clinical Pharmacist's contact information:                                                      Please feel free to contact me with any questions or concerns you have.      Lauren Bloch, PharmD  Medication Therapy Management Pharmacist   [facility]             "

## 2024-09-18 ASSESSMENT — ASTHMA QUESTIONNAIRES
ACT_TOTALSCORE: 24
QUESTION_3 LAST FOUR WEEKS HOW OFTEN DID YOUR ASTHMA SYMPTOMS (WHEEZING, COUGHING, SHORTNESS OF BREATH, CHEST TIGHTNESS OR PAIN) WAKE YOU UP AT NIGHT OR EARLIER THAN USUAL IN THE MORNING: NOT AT ALL
ACT_TOTALSCORE: 24
QUESTION_2 LAST FOUR WEEKS HOW OFTEN HAVE YOU HAD SHORTNESS OF BREATH: NOT AT ALL
QUESTION_5 LAST FOUR WEEKS HOW WOULD YOU RATE YOUR ASTHMA CONTROL: COMPLETELY CONTROLLED
QUESTION_1 LAST FOUR WEEKS HOW MUCH OF THE TIME DID YOUR ASTHMA KEEP YOU FROM GETTING AS MUCH DONE AT WORK, SCHOOL OR AT HOME: NONE OF THE TIME
QUESTION_4 LAST FOUR WEEKS HOW OFTEN HAVE YOU USED YOUR RESCUE INHALER OR NEBULIZER MEDICATION (SUCH AS ALBUTEROL): ONCE A WEEK OR LESS

## 2024-09-19 ENCOUNTER — OFFICE VISIT (OUTPATIENT)
Dept: FAMILY MEDICINE | Facility: CLINIC | Age: 44
End: 2024-09-19
Payer: COMMERCIAL

## 2024-09-19 VITALS
RESPIRATION RATE: 18 BRPM | WEIGHT: 293 LBS | BODY MASS INDEX: 44.41 KG/M2 | SYSTOLIC BLOOD PRESSURE: 117 MMHG | TEMPERATURE: 97.2 F | HEART RATE: 77 BPM | HEIGHT: 68 IN | DIASTOLIC BLOOD PRESSURE: 90 MMHG | OXYGEN SATURATION: 99 %

## 2024-09-19 DIAGNOSIS — Z12.31 VISIT FOR SCREENING MAMMOGRAM: ICD-10-CM

## 2024-09-19 DIAGNOSIS — R73.03 PREDIABETES: ICD-10-CM

## 2024-09-19 DIAGNOSIS — Z00.00 ROUTINE GENERAL MEDICAL EXAMINATION AT A HEALTH CARE FACILITY: Primary | ICD-10-CM

## 2024-09-19 DIAGNOSIS — I10 ESSENTIAL HYPERTENSION, BENIGN: ICD-10-CM

## 2024-09-19 LAB
ANION GAP SERPL CALCULATED.3IONS-SCNC: 12 MMOL/L (ref 7–15)
BUN SERPL-MCNC: 6.9 MG/DL (ref 6–20)
CALCIUM SERPL-MCNC: 9.3 MG/DL (ref 8.8–10.4)
CHLORIDE SERPL-SCNC: 102 MMOL/L (ref 98–107)
CHOLEST SERPL-MCNC: 131 MG/DL
CREAT SERPL-MCNC: 0.7 MG/DL (ref 0.51–0.95)
EGFRCR SERPLBLD CKD-EPI 2021: >90 ML/MIN/1.73M2
EST. AVERAGE GLUCOSE BLD GHB EST-MCNC: 111 MG/DL
FASTING STATUS PATIENT QL REPORTED: YES
FASTING STATUS PATIENT QL REPORTED: YES
GLUCOSE SERPL-MCNC: 94 MG/DL (ref 70–99)
HBA1C MFR BLD: 5.5 % (ref 0–5.6)
HCO3 SERPL-SCNC: 21 MMOL/L (ref 22–29)
HDLC SERPL-MCNC: 43 MG/DL
LDLC SERPL CALC-MCNC: 64 MG/DL
NONHDLC SERPL-MCNC: 88 MG/DL
POTASSIUM SERPL-SCNC: 4.4 MMOL/L (ref 3.4–5.3)
SODIUM SERPL-SCNC: 135 MMOL/L (ref 135–145)
TRIGL SERPL-MCNC: 118 MG/DL

## 2024-09-19 PROCEDURE — 80061 LIPID PANEL: CPT | Performed by: FAMILY MEDICINE

## 2024-09-19 PROCEDURE — 36415 COLL VENOUS BLD VENIPUNCTURE: CPT | Performed by: FAMILY MEDICINE

## 2024-09-19 PROCEDURE — 99213 OFFICE O/P EST LOW 20 MIN: CPT | Mod: 25 | Performed by: FAMILY MEDICINE

## 2024-09-19 PROCEDURE — 80048 BASIC METABOLIC PNL TOTAL CA: CPT | Performed by: FAMILY MEDICINE

## 2024-09-19 PROCEDURE — 90656 IIV3 VACC NO PRSV 0.5 ML IM: CPT | Performed by: FAMILY MEDICINE

## 2024-09-19 PROCEDURE — 99396 PREV VISIT EST AGE 40-64: CPT | Mod: 25 | Performed by: FAMILY MEDICINE

## 2024-09-19 PROCEDURE — 91320 SARSCV2 VAC 30MCG TRS-SUC IM: CPT | Performed by: FAMILY MEDICINE

## 2024-09-19 PROCEDURE — 83036 HEMOGLOBIN GLYCOSYLATED A1C: CPT | Performed by: FAMILY MEDICINE

## 2024-09-19 PROCEDURE — 90471 IMMUNIZATION ADMIN: CPT | Performed by: FAMILY MEDICINE

## 2024-09-19 PROCEDURE — 90480 ADMN SARSCOV2 VAC 1/ONLY CMP: CPT | Performed by: FAMILY MEDICINE

## 2024-09-19 NOTE — PATIENT INSTRUCTIONS
Please check BP once per day for 2-3 weeks, after sitting 10 minutes.     Send me a message with the results and we can discuss whether to try medication.   Patient Education   Preventive Care Advice   This is general advice given by our system to help you stay healthy. However, your care team may have specific advice just for you. Please talk to your care team about your preventive care needs.  Nutrition  Eat 5 or more servings of fruits and vegetables each day.  Try wheat bread, brown rice and whole grain pasta (instead of white bread, rice, and pasta).  Get enough calcium and vitamin D. Check the label on foods and aim for 100% of the RDA (recommended daily allowance).  Lifestyle  Exercise at least 150 minutes each week  (30 minutes a day, 5 days a week).  Do muscle strengthening activities 2 days a week. These help control your weight and prevent disease.  No smoking.  Wear sunscreen to prevent skin cancer.  Have a dental exam and cleaning every 6 months.  Yearly exams  See your health care team every year to talk about:  Any changes in your health.  Any medicines your care team has prescribed.  Preventive care, family planning, and ways to prevent chronic diseases.  Shots (vaccines)   HPV shots (up to age 26), if you've never had them before.  Hepatitis B shots (up to age 59), if you've never had them before.  COVID-19 shot: Get this shot when it's due.  Flu shot: Get a flu shot every year.  Tetanus shot: Get a tetanus shot every 10 years.  Pneumococcal, hepatitis A, and RSV shots: Ask your care team if you need these based on your risk.  Shingles shot (for age 50 and up)  General health tests  Diabetes screening:  Starting at age 35, Get screened for diabetes at least every 3 years.  If you are younger than age 35, ask your care team if you should be screened for diabetes.  Cholesterol test: At age 39, start having a cholesterol test every 5 years, or more often if advised.  Bone density scan (DEXA): At age  50, ask your care team if you should have this scan for osteoporosis (brittle bones).  Hepatitis C: Get tested at least once in your life.  STIs (sexually transmitted infections)  Before age 24: Ask your care team if you should be screened for STIs.  After age 24: Get screened for STIs if you're at risk. You are at risk for STIs (including HIV) if:  You are sexually active with more than one person.  You don't use condoms every time.  You or a partner was diagnosed with a sexually transmitted infection.  If you are at risk for HIV, ask about PrEP medicine to prevent HIV.  Get tested for HIV at least once in your life, whether you are at risk for HIV or not.  Cancer screening tests  Cervical cancer screening: If you have a cervix, begin getting regular cervical cancer screening tests starting at age 21.  Breast cancer scan (mammogram): If you've ever had breasts, begin having regular mammograms starting at age 40. This is a scan to check for breast cancer.  Colon cancer screening: It is important to start screening for colon cancer at age 45.  Have a colonoscopy test every 10 years (or more often if you're at risk) Or, ask your provider about stool tests like a FIT test every year or Cologuard test every 3 years.  To learn more about your testing options, visit:   .  For help making a decision, visit:   https://bit.ly/ld02822.  Prostate cancer screening test: If you have a prostate, ask your care team if a prostate cancer screening test (PSA) at age 55 is right for you.  Lung cancer screening: If you are a current or former smoker ages 50 to 80, ask your care team if ongoing lung cancer screenings are right for you.  For informational purposes only. Not to replace the advice of your health care provider. Copyright   2023 [employer]. All rights reserved. Clinically reviewed by the [facility] Transitions Program. [place] 474690 - REV 01/24.  Substance Use Disorder: Care Instructions  Overview      You can improve your life and health by stopping your use of alcohol or drugs. When you don't drink or use drugs, you may feel and sleep better. You may get along better with your family, friends, and coworkers. There are medicines and programs that can help with substance use disorder.  How can you care for yourself at home?  Here are some ways to help you stay sober and prevent relapse.  If you have been given medicine to help keep you sober or reduce your cravings, be sure to take it exactly as prescribed.  Talk to your doctor about programs that can help you stop using drugs or drinking alcohol.  Do not keep alcohol or drugs in your home.  Plan ahead. Think about what you'll say if other people ask you to drink or use drugs. Try not to spend time with people who drink or use drugs.  Use the time and money spent on drinking or drugs to do something that's important to you.  Preventing a relapse  Have a plan to deal with relapse. Learn to recognize changes in your thinking that lead you to drink or use drugs. Get help before you start to drink or use drugs again.  Try to stay away from situations, friends, or places that may lead you to drink or use drugs.  If you feel the need to drink alcohol or use drugs again, seek help right away. Call a trusted friend or family member. Some people get support from organizations such as Narcotics Anonymous or [employer] or from treatment facilities.  If you relapse, get help as soon as you can. Some people make a plan with another person that outlines what they want that person to do for them if they relapse. The plan usually includes how to handle the relapse and who to notify in case of relapse.  Don't give up. Remember that a relapse doesn't mean that you have failed. Use the experience to learn the triggers that lead you to drink or use drugs. Then quit again. Recovery is a lifelong process. Many people have several relapses before they are able to quit for  "good.  Follow-up care is a key part of your treatment and safety. Be sure to make and go to all appointments, and call your doctor if you are having problems. It's also a good idea to know your test results and keep a list of the medicines you take.  When should you call for help?   Call 911  anytime you think you may need emergency care. For example, call if you or someone else:    Has overdosed or has withdrawal signs. Be sure to tell the emergency workers that you are or someone else is using or trying to quit using drugs. Overdose or withdrawal signs may include:  Losing consciousness.  Seizure.  Seeing or hearing things that aren't there (hallucinations).     Is thinking or talking about suicide or harming others.   Where to get help 24 hours a day, 7 days a week   If you or someone you know talks about suicide, self-harm, a mental health crisis, a substance use crisis, or any other kind of emotional distress, get help right away. You can:    Call the Suicide and Crisis Lifeline at 988.     Call 8-574-675-MFQX (1-561.769.9436).     Text HOME to 939065 to access the Crisis Text Line.   Consider saving these numbers in your phone.  Go to "[employer] for more information or to chat online.  Call your doctor now or seek immediate medical care if:    You are having withdrawal symptoms. These may include nausea or vomiting, sweating, shakiness, and anxiety.   Watch closely for changes in your health, and be sure to contact your doctor if:    You have a relapse.     You need more help or support to stop.   Where can you learn more?  Go to https://www.[employer].net/patiented  Enter H573 in the search box to learn more about \"Substance Use Disorder: Care Instructions.\"  Current as of: November 15, 2023               Content Version: 14.0    6428-8467 [employer].   Care instructions adapted under license by your healthcare professional. If you have questions about a medical condition or this " instruction, always ask your healthcare professional. [employer], Inc[employer] disclaims any warranty or liability for your use of this information.

## 2024-09-19 NOTE — PROGRESS NOTES
"Preventive Care Visit  [facility]HACaronde[facility] HealthSWATHI Turner MD, Family Medicine  Sep 19, 2024      Assessment & Plan     Routine general medical examination at a health care facility    Visit for screening mammogram: will order 3D given that at her younger age, she likely has denser breasts and would benefit from the greater sensitivity with a 3D mammo.  - MA Screening Bilateral w/ Chris    Screening for HIV (human immunodeficiency virus)    Need for hepatitis C screening test    Essential hypertension, benign: Will recheck blood pressure at home daily and she will send me the results after 2 or 3 weeks.  If it is persistently elevated or on the higher side of normal, would recommend starting medication.  - Home Blood Pressure Monitor Order  - BASIC METABOLIC PANEL    Prediabetes  - Hemoglobin A1c  - Lipid Profile      Patient has been advised of split billing requirements and indicates understanding: Yes        BMI  Estimated body mass index is 48.08 kg/m  as calculated from the following:    Height as of this encounter: 1.727 m (5' 7.99\").    Weight as of this encounter: 143.4 kg (316 lb 1.9 oz).       Counseling  Appropriate preventive services were addressed with this patient via screening, questionnaire, or discussion as appropriate for fall prevention, nutrition, physical activity, Tobacco-use cessation, social engagement, weight loss and cognition.  Checklist reviewing preventive services available has been given to the patient.  Reviewed patient's diet, addressing concerns and/or questions.   The patient was instructed to see the dentist every 6 months.   She is at risk for psychosocial distress and has been provided with information to reduce risk.           Hari Sawyer is a 44 year old, presenting for the following:  Physical        9/19/2024    11:13 AM   Additional Questions   Roomed by LAURA BENITEZ MA   Accompanied by SELF        Health Care Directive  Patient does not have a Health Care " Directive or Living Will: Discussed advance care planning with patient; information given to patient to review.    HPI    Has lost 40 lbs on Wegovy. Going to the gym.     Blood pressure- checks sometimes at a pharmacy. It's usually around 120/85. Used BP meds many years ago but not currently.     Busy with work and volunteering. Older dog passed away but younger one is doing ok.          9/18/2024   General Health   How would you rate your overall physical health? Good   Feel stress (tense, anxious, or unable to sleep) Only a little      (!) STRESS CONCERN      9/18/2024   Nutrition   Three or more servings of calcium each day? Yes   Diet: Regular (no restrictions)   How many servings of fruit and vegetables per day? (!) 0-1   How many sweetened beverages each day? 0-1            9/18/2024   Exercise   Days per week of moderate/strenous exercise 4 days   Average minutes spent exercising at this level 30 min            9/18/2024   Social Factors   Frequency of gathering with friends or relatives Twice a week   Worry food won't last until get money to buy more No   Food not last or not have enough money for food? No   Do you have housing? (Housing is defined as stable permanent housing and does not include staying ouside in a car, in a tent, in an abandoned building, in an overnight shelter, or couch-surfing.) Yes   Are you worried about losing your housing? No   Lack of transportation? No   Unable to get utilities (heat,electricity)? No            9/18/2024   Dental   Dentist two times every year? (!) NO            9/18/2024   TB Screening   Were you born outside of the [place]? No              Today's PHQ-2 Score:       8/8/2024     8:13 AM   PHQ-2 ( 1999 Pfizer)   Q1: Little interest or pleasure in doing things 0   Q2: Feeling down, depressed or hopeless 0   PHQ-2 Score 0         9/18/2024   Substance Use   Alcohol more than 3/day or more than 7/wk No   Do you use any other substances recreationally? (!) CANNABIS  PRODUCTS        Social History     Tobacco Use    Smoking status: Former     Current packs/day: 0.00     Types: Cigarettes     Start date: 1999     Quit date: 2011     Years since quittin.6    Smokeless tobacco: Never    Tobacco comments:     Mainly smoked when drinking/at bars   Vaping Use    Vaping status: Never Used   Substance Use Topics    Alcohol use: Yes     Comment: Alcoholic Drinks/day: 2 -3 drinks/week    Drug use: Yes     Types: Marijuana     Comment: Edibles           2023   LAST FHS-7 RESULTS   1st degree relative breast or ovarian cancer No   Any relative bilateral breast cancer Unknown   Any male have breast cancer No   Any ONE woman have BOTH breast AND ovarian cancer Yes   Any woman with breast cancer before 50yrs No   2 or more relatives with breast AND/OR ovarian cancer No   2 or more relatives with breast AND/OR bowel cancer No           Mammogram Screening - Mammogram every 1-2 years updated in Health Maintenance based on mutual decision making          2024   One time HIV Screening   Previous HIV test? Yes          2024   STI Screening   New sexual partner(s) since last STI/HIV test? No        History of abnormal Pap smear: No - age 30- 64 PAP with HPV every 5 years recommended        Latest Ref Rng & Units 2023     2:16 PM   PAP / HPV   PAP  Negative for Intraepithelial Lesion or Malignancy (NILM)    HPV 16 DNA Negative Negative    HPV 18 DNA Negative Negative    Other HR HPV Negative Negative      ASCVD Risk   The ASCVD Risk score (Clarence BAUGH, et al., 2019) failed to calculate for the following reasons:    The systolic blood pressure is missing        2024   Contraception/Family Planning   Questions about contraception or family planning No           Reviewed and updated as needed this visit by Provider                    Past Medical History:   Diagnosis Date    Arthritis     Depressive disorder     Uncomplicated asthma 2017         Review  "of Systems  Constitutional, HEENT, cardiovascular, pulmonary, gi and gu systems are negative, except as otherwise noted.     Objective    Exam  There were no vitals taken for this visit.   Estimated body mass index is 47.45 kg/m  as calculated from the following:    Height as of 6/24/24: 1.727 m (5' 7.99\").    Weight as of 8/8/24: 141.5 kg (312 lb).    Physical Exam  GENERAL: alert and no distress  EYES: Eyes grossly normal to inspection, PERRL and conjunctivae and sclerae normal  HENT: ear canals and TM's normal, nose and mouth without ulcers or lesions  NECK: no adenopathy, no asymmetry, masses, or scars  RESP: lungs clear to auscultation - no rales, rhonchi or wheezes  CV: regular rate and rhythm, normal S1 S2, no S3 or S4, no murmur, click or rub, no peripheral edema  ABDOMEN: soft, nontender, no hepatosplenomegaly, no masses and bowel sounds normal  MS: no gross musculoskeletal defects noted, no edema  SKIN: no suspicious lesions or rashes  NEURO: Normal strength and tone, mentation intact and speech normal  PSYCH: mentation appears normal, affect normal/bright        Signed Electronically by: Lindsay Turner MD  DME (Durable Medical Equipment) Orders and Documentation  Orders Placed This Encounter   Procedures    Home Blood Pressure Monitor Order        The patient was assessed and it was determined the patient is in need of the following listed DME Supplies/Equipment. Please complete supporting documentation below to demonstrate medical necessity.         "

## 2024-09-24 ENCOUNTER — ANCILLARY PROCEDURE (OUTPATIENT)
Dept: MAMMOGRAPHY | Facility: CLINIC | Age: 44
End: 2024-09-24
Attending: FAMILY MEDICINE
Payer: COMMERCIAL

## 2024-09-24 DIAGNOSIS — Z12.31 VISIT FOR SCREENING MAMMOGRAM: ICD-10-CM

## 2024-09-24 PROCEDURE — 77067 SCR MAMMO BI INCL CAD: CPT | Mod: 26

## 2024-09-24 PROCEDURE — 77063 BREAST TOMOSYNTHESIS BI: CPT

## 2024-09-24 PROCEDURE — 77063 BREAST TOMOSYNTHESIS BI: CPT | Mod: 26

## 2024-10-23 DIAGNOSIS — K21.9 GERD WITHOUT ESOPHAGITIS: ICD-10-CM

## 2024-10-25 RX ORDER — FAMOTIDINE 20 MG/1
TABLET, FILM COATED ORAL
Qty: 30 TABLET | Refills: 3 | Status: SHIPPED | OUTPATIENT
Start: 2024-10-25

## 2024-10-28 ENCOUNTER — TELEPHONE (OUTPATIENT)
Dept: ENDOCRINOLOGY | Facility: CLINIC | Age: 44
End: 2024-10-28
Payer: COMMERCIAL

## 2024-10-30 NOTE — TELEPHONE ENCOUNTER
Central Prior Authorization Team   Phone: [phone]    PA Initiation    Medication: Wegovy 2.4mg/ 0.75ml soaj  Insurance Company: [employer] ([facility]) - Phone [phone] Fax [phone]  Pharmacy Filling the Rx: [facility] - [place], [place] - [address]  Filling Pharmacy Phone: [phone]  Filling Pharmacy Fax:    Start Date: 10/30/2024

## 2024-11-06 NOTE — TELEPHONE ENCOUNTER
Prior Authorization Approval    Authorization Effective Date: 10/30/2024  Authorization Expiration Date: 10/30/2025  Medication: Wegovy 2.4mg/ 0.75ml soaj-PA APPROVED   Approved Dose/Quantity:   Reference #:     Insurance Company: [employer] ([facility]) - Phone [phone] Fax [phone]  Expected CoPay:       CoPay Card Available:      Foundation Assistance Needed:    Which Pharmacy is filling the prescription (Not needed for infusion/clinic administered): [facility] - [place], [place] - [address]   Pharmacy Notified:  Yes  Patient Notified:  No

## 2024-11-19 ENCOUNTER — VIRTUAL VISIT (OUTPATIENT)
Dept: ENDOCRINOLOGY | Facility: CLINIC | Age: 44
End: 2024-11-19
Attending: INTERNAL MEDICINE
Payer: COMMERCIAL

## 2024-11-19 DIAGNOSIS — E66.813 CLASS 3 SEVERE OBESITY DUE TO EXCESS CALORIES IN ADULT, UNSPECIFIED BMI, UNSPECIFIED WHETHER SERIOUS COMORBIDITY PRESENT (H): ICD-10-CM

## 2024-11-19 DIAGNOSIS — E66.01 CLASS 3 SEVERE OBESITY DUE TO EXCESS CALORIES IN ADULT, UNSPECIFIED BMI, UNSPECIFIED WHETHER SERIOUS COMORBIDITY PRESENT (H): ICD-10-CM

## 2024-11-19 ASSESSMENT — PAIN SCALES - GENERAL: PAINLEVEL_OUTOF10: NO PAIN (0)

## 2024-11-19 NOTE — PROGRESS NOTES
Return Medical Weight Management Note     Digna Jacome  MRN:  [id number]  :  1980  CECILE:  25    Dear Lindsay Turner MD,    I had the pleasure of seeing your patient Digna Jacome. She is a 44 year old female who I am continuing to see for treatment of obesity related to: anxiety, prediabetes, hypertension, back pain, hip pain         2023    11:20 AM   --   I have the following health issues associated with obesity Pre-Diabetes    High Blood Pressure    Asthma   I have the following symptoms associated with obesity Depression    Back Pain    Hip Pain   Late 20s - gained a lot weight from depression and anxiety --> weight was up to 275 lbs then lost 50 lbs with fasting and keto. She subsequently gained back and more and then plateau at 350 lbs.     Eating habit: stress eating, binges eating, has food noise in her head, obsession with food -- chip, cheese, pizza, savory food.     INTERVAL HISTORY:  Initial visit 2023. Started Saxenda 2023 and changed to Wegovy 3/2024  F/up with Lauren Bloch, [facility] 2024    Has titrated Wegovy up this year. Has been on Wegovy 2.4 mg  No side effects.   He lost about 44 lbs (12.4% TBW)  Appetite is under controlled.  Has been walking more.    Exercise: more walking outside    CURRENT WEIGHT:   310 lbs    Initial weight 354 lbs  Total weight loss 44 lbs (12.4% TBW)                 2024    11:32 AM   Changes and Difficulties   I have made the following changes to my diet since my last visit: nutrionist and hitting goals, two veggies/fruits per day,  protein every meal   With regards to my diet, I am still struggling with: no   I have made the following changes to my activity/exercise since my last visit: slowly increasing activity, walking, 30 min cardio goal on watch   With regards to my activity/exercise, I am still struggling with: sick for two of the 4 weeks       VITALS:  There were no vitals taken for this visit.    MEDICATIONS:   Current Outpatient Medications    Medication Sig Dispense Refill    albuterol (PROAIR HFA/PROVENTIL HFA/VENTOLIN HFA) 108 (90 Base) MCG/ACT inhaler Inhale 2 puffs into the lungs every 6 hours as needed 18 g 3    ALPRAZolam (XANAX) 0.25 MG tablet Take 0.25 mg by mouth daily as needed for anxiety      escitalopram (LEXAPRO) 20 MG tablet TAKE ONE TABLET BY MOUTH EVERY DAY 90 tablet 3    famotidine (PEPCID) 20 MG tablet USE ONE-HALF TO ONE TABLET (10-20 MG) BY MOUTH BEDTIME. 30 tablet 3    levonorgestrel (MIRENA) 20 MCG/DAY IUD 1 each by Intrauterine route      Semaglutide-Weight Management (WEGOVY) 2.4 MG/0.75ML pen Inject 2.4 mg subcutaneously once a week. 3 mL 2           6/4/2024    11:32 AM   Weight Loss Medication History Reviewed With Patient   Which weight loss medications are you currently taking on a regular basis? Wegovy   Are you having any side effects from the weight loss medication that we have prescribed you? Yes   If you are having side effects please describe: gi issues, heartburn       ASSESSMENT:   Digna Jacome is a 44 year old female who I am continuing to see for treatment of obesity related to: anxiety, prediabetes, hypertension, back pain, hip pain     Responded well to Wegovy with tolerable side effects  Currently on Wegovy 1.0 mg daily  Feels that appetite is under control    PLAN:   - continue Wegovy 2.4 mg weekly  - continue to work on healthy food choice  - continue exercising regularly    FOLLOW-UP:    See MTM in 3-4 month(s)  See Dr.Tasma MACDONALD in 6-8 month(s)    Joined the call at 11/19/2024, 9:01:09 am.  Left the call at 11/19/2024, 9:04:44 am.  You were on the call for 3 minutes 34 seconds .    Sincerely,    Kamaljit Stephens MD

## 2024-11-19 NOTE — LETTER
2024       RE: Digna Jacome  [address]   [facility] [address]     Dear Colleague,    Thank you for referring your patient, Digna Jacome, to the [facility]NA[facility] [place] at [facility]. Please see a copy of my visit note below.    Return Medical Weight Management Note     Digna Jacome  MRN:  [id number]  :  1980  CECILE:  24    Dear Lindsay Turner MD,    I had the pleasure of seeing your patient Digna Jacome. She is a 44 year old female who I am continuing to see for treatment of obesity related to: anxiety, prediabetes, hypertension, back pain, hip pain         2023    11:20 AM   --   I have the following health issues associated with obesity Pre-Diabetes    High Blood Pressure    Asthma   I have the following symptoms associated with obesity Depression    Back Pain    Hip Pain   Late 20s - gained a lot weight from depression and anxiety --> weight was up to 275 lbs then lost 50 lbs with fasting and keto. She subsequently gained back and more and then plateau at 350 lbs.     Eating habit: stress eating, binges eating, has food noise in her head, obsession with food -- chip, cheese, pizza, savory food.     INTERVAL HISTORY:  Initial visit 2023. Started Saxenda 2023 and changed to Wegovy 3/2024  F/up with Lauren Bloch, [facility] 2024    Has titrated Wegovy up this year. Has been on Wegovy 2.4 mg  No side effects.   He lost about 44 lbs (12.4% TBW)  Appetite is under controlled.  Has been walking more.    Exercise: more walking outside    CURRENT WEIGHT:   310 lbs    Initial weight 354 lbs  Total weight loss 44 lbs (12.4% TBW)                 2024    11:32 AM   Changes and Difficulties   I have made the following changes to my diet since my last visit: nutrionist and hitting goals, two veggies/fruits per day,  protein every meal   With regards to my diet, I am still struggling with: no   I have made the following  changes to my activity/exercise since my last visit: slowly increasing activity, walking, 30 min cardio goal on watch   With regards to my activity/exercise, I am still struggling with: sick for two of the 4 weeks       VITALS:  There were no vitals taken for this visit.    MEDICATIONS:   Current Outpatient Medications   Medication Sig Dispense Refill    albuterol (PROAIR HFA/PROVENTIL HFA/VENTOLIN HFA) 108 (90 Base) MCG/ACT inhaler Inhale 2 puffs into the lungs every 6 hours as needed 18 g 3    ALPRAZolam (XANAX) 0.25 MG tablet Take 0.25 mg by mouth daily as needed for anxiety      escitalopram (LEXAPRO) 20 MG tablet TAKE ONE TABLET BY MOUTH EVERY DAY 90 tablet 3    famotidine (PEPCID) 20 MG tablet USE ONE-HALF TO ONE TABLET (10-20 MG) BY MOUTH BEDTIME. 30 tablet 3    levonorgestrel (MIRENA) 20 MCG/DAY IUD 1 each by Intrauterine route      Semaglutide-Weight Management (WEGOVY) 2.4 MG/0.75ML pen Inject 2.4 mg subcutaneously once a week. 3 mL 2           6/4/2024    11:32 AM   Weight Loss Medication History Reviewed With Patient   Which weight loss medications are you currently taking on a regular basis? Wegovy   Are you having any side effects from the weight loss medication that we have prescribed you? Yes   If you are having side effects please describe: gi issues, heartburn       ASSESSMENT:   Digna Jacome is a 44 year old female who I am continuing to see for treatment of obesity related to: anxiety, prediabetes, hypertension, back pain, hip pain     Responded well to Wegovy with tolerable side effects  Currently on Wegovy 1.0 mg daily  Feels that appetite is under control    PLAN:   - continue Wegovy 2.4 mg weekly  - continue to work on healthy food choice  - continue exercising regularly    FOLLOW-UP:    See MTROBINA in 3-4 month(s)  See Dr.Tasma MACDONALD in 6-8 month(s)    Joined the call at 11/19/2024, 9:01:09 am.  Left the call at 11/19/2024, 9:04:44 am.  You were on the call for 3 minutes 34 seconds .      Again,  thank you for allowing me to participate in the care of your patient.      Sincerely,    Kamaljit Stephens MD

## 2024-11-19 NOTE — NURSING NOTE
Is the patient currently in the state of [place]? YES    Location: home    Visit mode:VIDEO    If the visit is dropped, the patient can be reconnected by: VIDEO VISIT: Text to cell phone:   Telephone Information:   Mobile [phone]    and VIDEO VISIT: Send to e-mail at: [email or web address]    Will anyone else be joining the visit? NO  (If patient encounters technical issues they should call [phone])    Are changes needed to the allergy or medication list? No    Are refills needed on medications prescribed by this physician? NO    Reason for visit: TANYA Boudreaux, Virtual Visit Facilitator    QNR Status:  pt completing on [facility]

## 2024-11-19 NOTE — PATIENT INSTRUCTIONS
PLAN:   - continue Wegovy 2.4 mg weekly  - continue to work on healthy food choice  - restart exercising regularly    FOLLOW-UP:    See MTM in 3-4 month(s)  See Dr.Tasma MACDONALD in 6-8 month(s)    If you have any questions, please do not hesitate to call Weight management clinic at [phone] or [phone].  If you need to fax, please fax to [phone].    Sincerely,    Kamaljit Stephens MD  Endocrinology

## 2024-12-04 ENCOUNTER — TELEPHONE (OUTPATIENT)
Dept: ENDOCRINOLOGY | Facility: CLINIC | Age: 44
End: 2024-12-04
Payer: COMMERCIAL

## 2024-12-04 NOTE — TELEPHONE ENCOUNTER
Left Voicemail (1st Attempt) and Sent Mychart (1st Attempt) for the patient to call back and schedule the following:    Appointment type: Return Weight Management  Appointment mode: In Person or Virtual Visit  Provider: Dr. Kamaljit Woo  Return date: Approx. 6-8 months  Specialty phone number: [phone]    Visit type: MTM  Visit mode: Virtual Visit or Telephone  Provider: Lauren Bloch  Return date: Approx. 3-4 months

## 2024-12-05 NOTE — TELEPHONE ENCOUNTER
Patient confirmed scheduled appointment:     Date: 5/27/25  Time: 9 AM  Visit type: Return Weight Management  Visit mode: Virtual Visit  Provider:  Dr. Kamaljit Woo  Location: [facility]    Additional Notes: Pt confirmed will be in [place] for appt

## 2025-02-05 DIAGNOSIS — K21.9 GERD WITHOUT ESOPHAGITIS: ICD-10-CM

## 2025-02-07 DIAGNOSIS — K21.9 GERD WITHOUT ESOPHAGITIS: ICD-10-CM

## 2025-02-10 RX ORDER — FAMOTIDINE 20 MG/1
TABLET, FILM COATED ORAL
Qty: 30 TABLET | Refills: 3 | Status: SHIPPED | OUTPATIENT
Start: 2025-02-10

## 2025-02-10 NOTE — TELEPHONE ENCOUNTER
Last Written Prescription:  famotidine (PEPCID) 20 MG tablet 30 tablet 3 10/25/2024 -- No   Sig: USE ONE-HALF TO ONE TABLET (10-20 MG) BY MOUTH BEDTIME.     ----------------------  Last Visit Date: 11-19-24  Future Visit Date: 5-27-25  ----------------------      [x]  Refill decision: Medication refilled per  wt [facility] protocol-policy.      Request from pharmacy:  Requested Prescriptions   Pending Prescriptions Disp Refills    famotidine (PEPCID) 20 MG tablet 30 tablet 3     Sig: Use one-half to one tablet (10-20 mg) by mouth bedtime.       H2 Blockers Protocol Passed - 2/10/2025  8:08 AM        Passed - Patient is age 12 or older        Passed - Medication is active on med list        Passed - Medication indicated for associated diagnosis     Medication is associated with one or more of the following diagnoses:  Erosive esophagitis - Gastric hypersecretion   Gastric ulcer   Gastroesophageal reflux disease   Indigestion   Ulcer of duodenum   Esophagitis   Gastritis   Gastrointestinal hemorrhage   Stress ulcer; Prophylaxis   Helicobacter pylori gastrointestinal tract infection - Ulcer of duodenum  Zollinger-Martin syndrome  Cystic Fibrosis  Bronchiectasis            Passed - Recent (12 mo) or future (90 days) visit within the authorizing provider's specialty     The patient must have completed an in-person or virtual visit within the past 12 months or has a future visit scheduled within the next 90 days with the authorizing provider s specialty.  Urgent care and e-visits do not qualify as an office visit for this protocol.

## 2025-02-11 RX ORDER — FAMOTIDINE 20 MG/1
TABLET, FILM COATED ORAL
Qty: 30 TABLET | Refills: 3 | OUTPATIENT
Start: 2025-02-11

## 2025-02-11 NOTE — TELEPHONE ENCOUNTER
famotidine (PEPCID) 20 MG tablet     The original prescription was reordered on 2/10/2025 by Bhakti Mendosa RN.    Addressed in a different encounter.

## 2025-02-24 ENCOUNTER — TELEPHONE (OUTPATIENT)
Dept: FAMILY MEDICINE | Facility: CLINIC | Age: 45
End: 2025-02-24
Payer: COMMERCIAL

## 2025-02-24 DIAGNOSIS — F41.1 GENERALIZED ANXIETY DISORDER: Primary | ICD-10-CM

## 2025-02-24 RX ORDER — ALPRAZOLAM 0.25 MG
0.25 TABLET ORAL DAILY PRN
Qty: 20 TABLET | Refills: 0 | Status: SHIPPED | OUTPATIENT
Start: 2025-02-24

## 2025-02-24 ASSESSMENT — ANXIETY QUESTIONNAIRES
3. WORRYING TOO MUCH ABOUT DIFFERENT THINGS: MORE THAN HALF THE DAYS
IF YOU CHECKED OFF ANY PROBLEMS ON THIS QUESTIONNAIRE, HOW DIFFICULT HAVE THESE PROBLEMS MADE IT FOR YOU TO DO YOUR WORK, TAKE CARE OF THINGS AT HOME, OR GET ALONG WITH OTHER PEOPLE: VERY DIFFICULT
1. FEELING NERVOUS, ANXIOUS, OR ON EDGE: MORE THAN HALF THE DAYS
GAD7 TOTAL SCORE: 14
5. BEING SO RESTLESS THAT IT IS HARD TO SIT STILL: MORE THAN HALF THE DAYS
7. FEELING AFRAID AS IF SOMETHING AWFUL MIGHT HAPPEN: MORE THAN HALF THE DAYS
6. BECOMING EASILY ANNOYED OR IRRITABLE: MORE THAN HALF THE DAYS
4. TROUBLE RELAXING: MORE THAN HALF THE DAYS
8. IF YOU CHECKED OFF ANY PROBLEMS, HOW DIFFICULT HAVE THESE MADE IT FOR YOU TO DO YOUR WORK, TAKE CARE OF THINGS AT HOME, OR GET ALONG WITH OTHER PEOPLE?: VERY DIFFICULT
GAD7 TOTAL SCORE: 14
7. FEELING AFRAID AS IF SOMETHING AWFUL MIGHT HAPPEN: MORE THAN HALF THE DAYS
GAD7 TOTAL SCORE: 14
2. NOT BEING ABLE TO STOP OR CONTROL WORRYING: MORE THAN HALF THE DAYS

## 2025-02-24 NOTE — TELEPHONE ENCOUNTER
Please let Digna know I refilled her Xanax for 20 pills.     Please keep her appt on the calendar for 2/26 so we can talk about her anxiety and see what else might help.     Lindsay Turner MD

## 2025-02-24 NOTE — TELEPHONE ENCOUNTER
General Call    Contacts       Contact Date/Time Type Contact Phone/Fax    02/24/2025 08:33 AM CST Phone (Incoming) Digna Jacome (Self) [phone] (M)          Reason for Call:  Medication request to refill & restart Xanax    What are your questions or concerns:      Patient is calling clinic today, asking if Dr. Turner would be able to refill her Xanax? Patient states she has been off of this medication for 4-5 years. Patient just came back from a trip last Saturday, and some she was unable to sleep, had 6 panic attacks. Overall, she is not doing well. Patient would like to restart Xanax if possible.     Patient tearful during call. Writer informed patient that since there is a gap in medication, writer advised a virtual visit with PCP to further discuss medication request. Patient agreeable to schedule virtual visit for:    Feb 26, 2025 11:30 AM  (Arrive by 11:25 AM)  Provider Visit with Lindsay Turner MD  [facility] ([facility] [phone]     Patient states she also has an appointment with her Therapist on today in the afternoon.  Patient cannot work right due to her symptoms  No thoughts of harming self, no suicidal ideation.    [facility] [place] [address]    Patient is asking if PCP is willing to prescribed some Xanax for her prior to their scheduled appointment for 2/26?    Date of last appointment with provider: 9/19/2024    Could we send this information to you in [facility] or would you prefer to receive a phone call?:   Patient would prefer a phone call   Okay to leave a detailed message?: No at Cell number on file:    Telephone Information:   Mobile [phone]     Routing message to Dr. Turner to review and advise.    Bob Weldon, JESUN, RN, PHN   [facility]

## 2025-02-25 NOTE — TELEPHONE ENCOUNTER
Called patient, relayed provider message to patient, patient verbalizes understanding of provider message.       Dallas Warren, MSN, RN   [facility]

## 2025-02-26 ENCOUNTER — VIRTUAL VISIT (OUTPATIENT)
Dept: FAMILY MEDICINE | Facility: CLINIC | Age: 45
End: 2025-02-26
Payer: COMMERCIAL

## 2025-02-26 DIAGNOSIS — F41.1 GENERALIZED ANXIETY DISORDER: ICD-10-CM

## 2025-02-26 RX ORDER — ALPRAZOLAM 0.25 MG
0.25 TABLET ORAL DAILY PRN
Qty: 20 TABLET | Refills: 0 | Status: CANCELLED | OUTPATIENT
Start: 2025-02-26

## 2025-02-26 NOTE — PROGRESS NOTES
"Digna is a 44 year old who is being evaluated via a billable video visit.    How would you like to obtain your AVS? MyChart  If the video visit is dropped, the invitation should be resent by: Text to cell phone: [phone]  Will anyone else be joining your video visit? No      Assessment & Plan     Generalized anxiety disorder: refilled Xanax for PRN use. Offered that if she is still struggling, she could let me know and we could consider FMLA leave.     Body mass index (BMI) 50.0-59.9, adult (H): did not explicitly discuss today but will keep in mind for future visits. She is on wegovy, following with weight management clinic.             BMI  Estimated body mass index is 48.08 kg/m  as calculated from the following:    Height as of 9/19/24: 1.727 m (5' 7.99\").    Weight as of 9/19/24: 143.4 kg (316 lb 1.9 oz).           Subjective   Digna is a 44 year old, presenting for the following health issues:  Recheck Medication (MGMT)      2/26/2025    11:23 AM   Additional Questions   Roomed by PAW P   Accompanied by SELF     History of Present Illness       Mental Health Follow-up:  Patient presents to follow-up on Depression & Anxiety.Patient's depression since last visit has been:  Bad  The patient is not having other symptoms associated with depression.  Patient's anxiety since last visit has been:  Worse  The patient is having other symptoms associated with anxiety.  Any significant life events: job concerns  Patient is feeling anxious or having panic attacks.  Patient has no concerns about alcohol or drug use.    She eats 0-1 servings of fruits and vegetables daily.She consumes 0 sweetened beverage(s) daily.She exercises with enough effort to increase her heart rate 20 to 29 minutes per day.  She exercises with enough effort to increase her heart rate 6 days per week.   She is taking medications regularly.       Digna has been very stressed recently. 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 She took a vacation but when she came back, she had 6 panic attacks due to worrying so much about going back to work. She had a prescription for 20 Xanax from 5 years ago and would like a refill. She only uses this occasionally and even just knowing she has it is enough to help her feel calmer.     She will be getting an accommodation from work so that she can only do paperwork for a few months and not take on new cases.         Objective    Vitals - Patient Reported  Weight (Patient Reported): 137.7 kg (303 lb 9.6 oz)  Height (Patient Reported): 172.5 cm (5' 7.9\")  BMI (Based on Pt Reported Ht/Wt): 46.3        Physical Exam   GENERAL: alert and no distress  EYES: Eyes grossly normal to inspection.  No discharge or erythema, or obvious scleral/conjunctival abnormalities.  RESP: No audible wheeze, cough, or visible cyanosis.    SKIN: Visible skin clear. No significant rash, abnormal pigmentation or lesions.  NEURO: Cranial nerves grossly intact.  Mentation and speech appropriate for age.  PSYCH: Appropriate affect, tone, and pace of words          Video-Visit Details    Type of service:  Video Visit   Originating Location (pt. Location): Home  START: 11:40:  END: 11:50    Distant Location (provider location):  On-site  Platform used for Video Visit: Pablo  Signed Electronically by: Lindsay Turner MD    "

## 2025-02-28 PROBLEM — E66.01 SEVERE OBESITY (BMI >= 40) (H): Status: ACTIVE | Noted: 2023-03-30

## 2025-03-19 ENCOUNTER — VIRTUAL VISIT (OUTPATIENT)
Dept: PHARMACY | Facility: CLINIC | Age: 45
End: 2025-03-19
Attending: INTERNAL MEDICINE
Payer: COMMERCIAL

## 2025-03-19 VITALS — HEIGHT: 68 IN | BODY MASS INDEX: 44.41 KG/M2 | WEIGHT: 293 LBS

## 2025-03-19 DIAGNOSIS — F41.1 GENERALIZED ANXIETY DISORDER: ICD-10-CM

## 2025-03-19 DIAGNOSIS — K21.9 GERD WITHOUT ESOPHAGITIS: ICD-10-CM

## 2025-03-19 DIAGNOSIS — E66.813 CLASS 3 SEVERE OBESITY DUE TO EXCESS CALORIES IN ADULT, UNSPECIFIED BMI, UNSPECIFIED WHETHER SERIOUS COMORBIDITY PRESENT (H): Primary | ICD-10-CM

## 2025-03-19 DIAGNOSIS — E66.01 CLASS 3 SEVERE OBESITY DUE TO EXCESS CALORIES IN ADULT, UNSPECIFIED BMI, UNSPECIFIED WHETHER SERIOUS COMORBIDITY PRESENT (H): Primary | ICD-10-CM

## 2025-03-19 NOTE — PROGRESS NOTES
Medication Therapy Management (MTM) Encounter    ASSESSMENT:                            Medication Adherence/Access: No issues identified.    Weight Management   Would benefit from continuing with Wegovy at 2.4 mg weekly due to continued efficacy.     GERD    Stable.     Anxiety   Improving, to continue to monitor. To continue to work with therapist.     PLAN:                            Continue current regimen without changes.    Follow-up: Return in about 5 months (around 8/19/2025) for Medication Therapy Management Pharmacist Visit.  Appointments in Next Year      May 27, 2025 9:00 AM  (Arrive by 8:45 AM)  Return Weight Management Visit with Kamaljit Stephens MD  [facility] [place] ([facility] ) [phone]     May 29, 2025 8:30 AM  (Arrive by 8:25 AM)  Provider Visit with Lindsay Turner MD  [facility] ([facility]) [phone]     Sep 22, 2025 10:30 AM  (Arrive by 10:10 AM)  Preventative Adult Visit with Lindsay Turner MD  [facility] ([facility]) [phone]            SUBJECTIVE/OBJECTIVE:                          Digna Jacome is a 44 year old female seen for a follow-up visit.       Reason for visit: Weight Management follow up.    Allergies/ADRs: Reviewed in chart  Past Medical History: Reviewed in chart  Tobacco: She reports that she quit smoking about 14 years ago. Her smoking use included cigarettes. She started smoking about 25 years ago. She has never been exposed to tobacco smoke. She has never used smokeless tobacco.  Alcohol: not currently using    Medication Adherence/Access: no issues reported.    Weight Management   Wegovy 2.4 mg once weekly     Following with Dr. Kamaljti [facility]. Patient reports the following medication side effects: none. Feels that went she increased to 2.4 mg she  "actually ended tolerating better than at lower dose. She does have some GI issues with higher fat or higher sugar meals and limiting. Leave from work due to mental health reasons.   Nutrition/Eating Habits: has been doing 2-3 meals per day. Working back to more consistent meals. Is trying to focus more on protein and fiber at meals. Previously in Jan/Feb was doing less of this, wasn't a priority and was skipping meals. Behavioral modification techniques.   Exercise/Activity:  has been hiking with dog through [email or web address]. Started back at gym 2 weeks ago now that she has time and bandwidth. At gym 2 days per week weights, 2 days cardio, such as 30 minutes fast walk on treadmill.     Medication History:  Saxenda: lack of efficacy    Initial Consult Weight: 345.2 lb      Current weight today: 300 lbs 0 oz  Cumulative Weight Loss: -45.2 lb, -13.2% from baseline    Wt Readings from Last 4 Encounters:   03/19/25 300 lb (136.1 kg)   09/19/24 316 lb 1.9 oz (143.4 kg)   08/08/24 312 lb (141.5 kg)   06/24/24 314 lb (142.4 kg)     Estimated body mass index is 45.61 kg/m  as calculated from the following:    Height as of this encounter: 5' 8\" (1.727 m).    Weight as of this encounter: 300 lb (136.1 kg).    GERD    Famotidine 20 mg once daily bedtime     Patient reports no current symptoms.   Patient feels that current regimen is effective.  If she misses dose she has heartburn the next day all day that is bothersome.        Mental Health   Anxiety  Escitalopram 20 mg once daily   Alprazolam 0.25 mg daily as needed     Patient reports no current medication side effects.  Patient reports symptoms are improved since taking leave from work for mental health. Was a recommendation from her therapist. Working biweekly with therapist. Feels she is able to focus more on herself. Significant stressors were going on at work, was taking alprazolam more and now no longer utilizing.        Today's Vitals: Ht 5' 8\" (1.727 m)   Wt 300 lb " (136.1 kg)   BMI 45.61 kg/m    ----------------    I spent 20 minutes with this patient today. All changes were made via collaborative practice agreement with Kamaljit Stephens.     A summary of these recommendations was sent via [employer].    Lauren Bloch, PharmD, BCACP   Medication Therapy Management Pharmacist   [facility]    Telemedicine Visit Details  The patient's medications can be safely assessed via a telemedicine encounter.  Type of service:  Telephone visit  Originating Location (pt. Location): Home    Distant Location (provider location):  Off-site  Start Time:  9:37 AM   End Time:  9:52 AM     Medication Therapy Recommendations  No medication therapy recommendations to display

## 2025-03-19 NOTE — NURSING NOTE
Current patient location: [place]    Is the patient currently in the state of [place]? YES    Visit mode: TELEPHONE    If the visit is dropped, the patient can be reconnected by:TELEPHONE VISIT: Phone number: Reviewed by phone messages, see Encounter section    Will anyone else be joining the visit? NO  (If patient encounters technical issues they should call [phone])    Are changes needed to the allergy or medication list? No, Pt stated no changes to allergies, and Pt stated no med changes    Are refills needed on medications prescribed by this physician? Discuss with provider    Rooming Documentation:  Patient declined to complete questionnaire(s)    Reason for visit: RECHECK (Follow up )    Darby SPRINGER

## 2025-03-19 NOTE — PATIENT INSTRUCTIONS
"Recommendations from today's MTM visit:                                                         Continue current regimen without changes.    Follow-up: Return in about 5 months (around 8/19/2025) for Medication Therapy Management Pharmacist Visit.  Appointments in Next Year      May 27, 2025 9:00 AM  (Arrive by 8:45 AM)  Return Weight Management Visit with Kamaljit Stephens MD  [facility] [place] ([facility] [place] ) [phone]     May 29, 2025 8:30 AM  (Arrive by 8:25 AM)  Provider Visit with Lindsay Turner MD  [facility] ([facility]) [phone]     Sep 22, 2025 10:30 AM  (Arrive by 10:10 AM)  Preventative Adult Visit with Lindsay Turner MD  [facility] ([facility]) [phone]            It was great speaking with you today.  I value your experience and would be very thankful for your time in providing feedback in our clinic survey. In the next few days, you may receive an email or text message from [employer] with a link to a survey related to your  clinical pharmacist.\"     To schedule another MTM appointment, please call the clinic directly or you may call the MTM scheduling line at [phone] or toll-free at [phone].     My Clinical Pharmacist's contact information:                                                      Please feel free to contact me with any questions or concerns you have.      Lauren Bloch, PharmD  Medication Therapy Management Pharmacist   [facility] [place]             "

## 2025-06-09 DIAGNOSIS — F41.1 GENERALIZED ANXIETY DISORDER: ICD-10-CM

## 2025-06-09 RX ORDER — ESCITALOPRAM OXALATE 20 MG/1
20 TABLET ORAL DAILY
Qty: 90 TABLET | Refills: 3 | Status: SHIPPED | OUTPATIENT
Start: 2025-06-09

## 2025-07-30 ENCOUNTER — TELEPHONE (OUTPATIENT)
Dept: ENDOCRINOLOGY | Facility: CLINIC | Age: 45
End: 2025-07-30

## 2025-07-30 DIAGNOSIS — K21.9 GERD WITHOUT ESOPHAGITIS: ICD-10-CM

## 2025-07-30 NOTE — TELEPHONE ENCOUNTER
[id number]              [facility]  Fax:[phone]  [place]: [phone]  [place]: [phone]

## 2025-07-30 NOTE — TELEPHONE ENCOUNTER
Retail Pharmacy Prior Authorization Team   Phone: [phone]    PA Initiation    Medication: WEGOVY 2.4 MG/0.75ML SC SOAJ  Insurance Company: [facility] - Phone [phone] Fax [phone]  Pharmacy Filling the Rx: [facility] - [place], [place] - [address]  Filling Pharmacy Phone: [phone]  Filling Pharmacy Fax:    Start Date: 7/30/2025    ALCIDES QUINTANA ([place]: [id number])

## 2025-07-31 NOTE — TELEPHONE ENCOUNTER
Prior Authorization Approval    Medication: WEGOVY 2.4 MG/0.75ML SC SOAJ  Authorization Effective Date: 6/30/2025  Authorization Expiration Date: 7/30/2026  Insurance Company: [employer] [place] - Phone [phone] Fax [phone]  Which Pharmacy is filling the prescription: [facility] - [place] [address]  Pharmacy Notified: YES  Patient Notified: YES (sent approval info to pharmacy and notified patient via [employer] message)

## 2025-08-05 ENCOUNTER — MYC MEDICAL ADVICE (OUTPATIENT)
Dept: PHARMACY | Facility: CLINIC | Age: 45
End: 2025-08-05